# Patient Record
Sex: FEMALE | Race: OTHER | HISPANIC OR LATINO | Employment: FULL TIME | URBAN - METROPOLITAN AREA
[De-identification: names, ages, dates, MRNs, and addresses within clinical notes are randomized per-mention and may not be internally consistent; named-entity substitution may affect disease eponyms.]

---

## 2017-01-17 DIAGNOSIS — Z12.31 ENCOUNTER FOR SCREENING MAMMOGRAM FOR MALIGNANT NEOPLASM OF BREAST: ICD-10-CM

## 2017-02-23 ENCOUNTER — GENERIC CONVERSION - ENCOUNTER (OUTPATIENT)
Dept: OTHER | Facility: OTHER | Age: 42
End: 2017-02-23

## 2017-04-07 LAB — HCV AB SER-ACNC: NEGATIVE

## 2017-04-14 DIAGNOSIS — Z12.31 ENCOUNTER FOR SCREENING MAMMOGRAM FOR MALIGNANT NEOPLASM OF BREAST: ICD-10-CM

## 2017-04-18 ENCOUNTER — GENERIC CONVERSION - ENCOUNTER (OUTPATIENT)
Dept: OTHER | Facility: OTHER | Age: 42
End: 2017-04-18

## 2017-06-13 ENCOUNTER — HOSPITAL ENCOUNTER (OUTPATIENT)
Dept: MAMMOGRAPHY | Facility: MEDICAL CENTER | Age: 42
Discharge: HOME | End: 2017-06-13
Attending: SURGERY
Payer: COMMERCIAL

## 2017-06-13 DIAGNOSIS — Z12.31 ENCOUNTER FOR SCREENING MAMMOGRAM FOR MALIGNANT NEOPLASM OF BREAST: ICD-10-CM

## 2017-06-13 PROCEDURE — G0202 SCR MAMMO BI INCL CAD: HCPCS

## 2017-06-13 PROCEDURE — 77063 BREAST TOMOSYNTHESIS BI: CPT

## 2017-06-30 ENCOUNTER — ALLSCRIPTS OFFICE VISIT (OUTPATIENT)
Dept: OTHER | Facility: OTHER | Age: 42
End: 2017-06-30

## 2017-07-21 ENCOUNTER — ALLSCRIPTS OFFICE VISIT (OUTPATIENT)
Dept: OTHER | Facility: OTHER | Age: 42
End: 2017-07-21

## 2017-09-11 ENCOUNTER — GENERIC CONVERSION - ENCOUNTER (OUTPATIENT)
Dept: OTHER | Facility: OTHER | Age: 42
End: 2017-09-11

## 2017-09-18 ENCOUNTER — ANESTHESIA EVENT (OUTPATIENT)
Dept: PERIOP | Facility: HOSPITAL | Age: 42
End: 2017-09-18
Payer: COMMERCIAL

## 2017-09-18 RX ORDER — IBUPROFEN 200 MG
TABLET ORAL EVERY 6 HOURS PRN
COMMUNITY

## 2017-09-18 RX ORDER — BUDESONIDE AND FORMOTEROL FUMARATE DIHYDRATE 160; 4.5 UG/1; UG/1
2 AEROSOL RESPIRATORY (INHALATION) EVERY EVENING
COMMUNITY
End: 2019-08-08

## 2017-09-18 RX ORDER — ALBUTEROL SULFATE 90 UG/1
2 AEROSOL, METERED RESPIRATORY (INHALATION) EVERY 6 HOURS PRN
COMMUNITY
End: 2019-08-08

## 2017-09-18 RX ORDER — ALBUTEROL SULFATE 2.5 MG/3ML
2.5 SOLUTION RESPIRATORY (INHALATION) EVERY 6 HOURS PRN
COMMUNITY
End: 2019-08-08

## 2017-09-19 ENCOUNTER — ANESTHESIA (OUTPATIENT)
Dept: PERIOP | Facility: HOSPITAL | Age: 42
End: 2017-09-19
Payer: COMMERCIAL

## 2017-09-19 ENCOUNTER — HOSPITAL ENCOUNTER (OUTPATIENT)
Facility: HOSPITAL | Age: 42
Setting detail: OUTPATIENT SURGERY
Discharge: HOME | End: 2017-09-19
Attending: SURGERY | Admitting: SURGERY
Payer: COMMERCIAL

## 2017-09-19 ENCOUNTER — GENERIC CONVERSION - ENCOUNTER (OUTPATIENT)
Dept: OTHER | Facility: OTHER | Age: 42
End: 2017-09-19

## 2017-09-19 VITALS
RESPIRATION RATE: 16 BRPM | SYSTOLIC BLOOD PRESSURE: 111 MMHG | WEIGHT: 160 LBS | HEIGHT: 62 IN | OXYGEN SATURATION: 96 % | DIASTOLIC BLOOD PRESSURE: 58 MMHG | TEMPERATURE: 99 F | BODY MASS INDEX: 29.44 KG/M2 | HEART RATE: 104 BPM

## 2017-09-19 DIAGNOSIS — N62 HYPERTROPHY OF BREAST: ICD-10-CM

## 2017-09-19 PROCEDURE — 88305 TISSUE EXAM BY PATHOLOGIST: CPT | Performed by: SURGERY

## 2017-09-19 PROCEDURE — C1760 CLOSURE DEV, VASC: HCPCS | Performed by: SURGERY

## 2017-09-19 RX ORDER — ONDANSETRON 2 MG/ML
INJECTION INTRAMUSCULAR; INTRAVENOUS AS NEEDED
Status: DISCONTINUED | OUTPATIENT
Start: 2017-09-19 | End: 2017-09-19 | Stop reason: SURG

## 2017-09-19 RX ORDER — MIDAZOLAM HYDROCHLORIDE 1 MG/ML
INJECTION INTRAMUSCULAR; INTRAVENOUS AS NEEDED
Status: DISCONTINUED | OUTPATIENT
Start: 2017-09-19 | End: 2017-09-19 | Stop reason: SURG

## 2017-09-19 RX ORDER — HYDROMORPHONE HCL 110MG/55ML
0.5 PATIENT CONTROLLED ANALGESIA SYRINGE INTRAVENOUS
Status: DISCONTINUED | OUTPATIENT
Start: 2017-09-19 | End: 2017-09-19 | Stop reason: HOSPADM

## 2017-09-19 RX ORDER — OXYCODONE HYDROCHLORIDE AND ACETAMINOPHEN 5; 325 MG/1; MG/1
1 TABLET ORAL EVERY 4 HOURS PRN
Qty: 20 TABLET | Refills: 0 | Status: SHIPPED | OUTPATIENT
Start: 2017-09-19 | End: 2018-06-04 | Stop reason: ALTCHOICE

## 2017-09-19 RX ORDER — BUPIVACAINE HYDROCHLORIDE 2.5 MG/ML
INJECTION, SOLUTION EPIDURAL; INFILTRATION; INTRACAUDAL AS NEEDED
Status: DISCONTINUED | OUTPATIENT
Start: 2017-09-19 | End: 2017-09-19 | Stop reason: HOSPADM

## 2017-09-19 RX ORDER — ROCURONIUM BROMIDE 10 MG/ML
INJECTION, SOLUTION INTRAVENOUS AS NEEDED
Status: DISCONTINUED | OUTPATIENT
Start: 2017-09-19 | End: 2017-09-19 | Stop reason: SURG

## 2017-09-19 RX ORDER — OXYCODONE HYDROCHLORIDE AND ACETAMINOPHEN 5; 325 MG/1; MG/1
1 TABLET ORAL EVERY 4 HOURS PRN
Status: DISCONTINUED | OUTPATIENT
Start: 2017-09-19 | End: 2017-09-19 | Stop reason: HOSPADM

## 2017-09-19 RX ORDER — SODIUM CHLORIDE 9 MG/ML
125 INJECTION, SOLUTION INTRAVENOUS CONTINUOUS
Status: DISCONTINUED | OUTPATIENT
Start: 2017-09-19 | End: 2017-09-19 | Stop reason: HOSPADM

## 2017-09-19 RX ORDER — PROPOFOL 10 MG/ML
INJECTION, EMULSION INTRAVENOUS AS NEEDED
Status: DISCONTINUED | OUTPATIENT
Start: 2017-09-19 | End: 2017-09-19 | Stop reason: SURG

## 2017-09-19 RX ORDER — GLYCOPYRROLATE 0.2 MG/ML
INJECTION INTRAMUSCULAR; INTRAVENOUS AS NEEDED
Status: DISCONTINUED | OUTPATIENT
Start: 2017-09-19 | End: 2017-09-19 | Stop reason: SURG

## 2017-09-19 RX ORDER — HYDROMORPHONE HYDROCHLORIDE 2 MG/ML
INJECTION, SOLUTION INTRAMUSCULAR; INTRAVENOUS; SUBCUTANEOUS AS NEEDED
Status: DISCONTINUED | OUTPATIENT
Start: 2017-09-19 | End: 2017-09-19 | Stop reason: SURG

## 2017-09-19 RX ORDER — FENTANYL CITRATE/PF 50 MCG/ML
25 SYRINGE (ML) INJECTION
Status: COMPLETED | OUTPATIENT
Start: 2017-09-19 | End: 2017-09-19

## 2017-09-19 RX ORDER — FENTANYL CITRATE 50 UG/ML
INJECTION, SOLUTION INTRAMUSCULAR; INTRAVENOUS AS NEEDED
Status: DISCONTINUED | OUTPATIENT
Start: 2017-09-19 | End: 2017-09-19 | Stop reason: SURG

## 2017-09-19 RX ORDER — MAGNESIUM HYDROXIDE 1200 MG/15ML
LIQUID ORAL AS NEEDED
Status: DISCONTINUED | OUTPATIENT
Start: 2017-09-19 | End: 2017-09-19 | Stop reason: HOSPADM

## 2017-09-19 RX ORDER — SODIUM CHLORIDE, SODIUM LACTATE, POTASSIUM CHLORIDE, CALCIUM CHLORIDE 600; 310; 30; 20 MG/100ML; MG/100ML; MG/100ML; MG/100ML
100 INJECTION, SOLUTION INTRAVENOUS CONTINUOUS
Status: DISCONTINUED | OUTPATIENT
Start: 2017-09-19 | End: 2017-09-19 | Stop reason: HOSPADM

## 2017-09-19 RX ORDER — ONDANSETRON 2 MG/ML
4 INJECTION INTRAMUSCULAR; INTRAVENOUS ONCE AS NEEDED
Status: DISCONTINUED | OUTPATIENT
Start: 2017-09-19 | End: 2017-09-19 | Stop reason: HOSPADM

## 2017-09-19 RX ORDER — LIDOCAINE HYDROCHLORIDE AND EPINEPHRINE 10; 10 MG/ML; UG/ML
INJECTION, SOLUTION INFILTRATION; PERINEURAL AS NEEDED
Status: DISCONTINUED | OUTPATIENT
Start: 2017-09-19 | End: 2017-09-19 | Stop reason: HOSPADM

## 2017-09-19 RX ADMIN — OXYCODONE HYDROCHLORIDE AND ACETAMINOPHEN 1 TABLET: 5; 325 TABLET ORAL at 14:40

## 2017-09-19 RX ADMIN — FENTANYL CITRATE 25 MCG: 50 INJECTION, SOLUTION INTRAMUSCULAR; INTRAVENOUS at 13:13

## 2017-09-19 RX ADMIN — HYDROMORPHONE HYDROCHLORIDE 1 MG: 2 INJECTION, SOLUTION INTRAMUSCULAR; INTRAVENOUS; SUBCUTANEOUS at 11:11

## 2017-09-19 RX ADMIN — SODIUM CHLORIDE: 0.9 INJECTION, SOLUTION INTRAVENOUS at 12:01

## 2017-09-19 RX ADMIN — FENTANYL CITRATE 50 MCG: 50 INJECTION, SOLUTION INTRAMUSCULAR; INTRAVENOUS at 08:58

## 2017-09-19 RX ADMIN — ROCURONIUM BROMIDE 10 MG: 10 INJECTION, SOLUTION INTRAVENOUS at 11:11

## 2017-09-19 RX ADMIN — FENTANYL CITRATE 25 MCG: 50 INJECTION, SOLUTION INTRAMUSCULAR; INTRAVENOUS at 13:10

## 2017-09-19 RX ADMIN — FENTANYL CITRATE 50 MCG: 50 INJECTION, SOLUTION INTRAMUSCULAR; INTRAVENOUS at 09:49

## 2017-09-19 RX ADMIN — HYDROMORPHONE HYDROCHLORIDE 1 MG: 2 INJECTION, SOLUTION INTRAMUSCULAR; INTRAVENOUS; SUBCUTANEOUS at 10:55

## 2017-09-19 RX ADMIN — FENTANYL CITRATE 50 MCG: 50 INJECTION, SOLUTION INTRAMUSCULAR; INTRAVENOUS at 10:21

## 2017-09-19 RX ADMIN — SODIUM CHLORIDE: 0.9 INJECTION, SOLUTION INTRAVENOUS at 09:45

## 2017-09-19 RX ADMIN — LIDOCAINE HYDROCHLORIDE 40 MG: 20 INJECTION, SOLUTION INTRAVENOUS at 08:58

## 2017-09-19 RX ADMIN — NEOSTIGMINE METHYLSULFATE 5 MG: 1 INJECTION, SOLUTION INTRAMUSCULAR; INTRAVENOUS; SUBCUTANEOUS at 12:27

## 2017-09-19 RX ADMIN — CEFAZOLIN SODIUM 1000 MG: 1 SOLUTION INTRAVENOUS at 12:54

## 2017-09-19 RX ADMIN — ROCURONIUM BROMIDE 20 MG: 10 INJECTION, SOLUTION INTRAVENOUS at 10:21

## 2017-09-19 RX ADMIN — PROPOFOL 200 MG: 10 INJECTION, EMULSION INTRAVENOUS at 08:58

## 2017-09-19 RX ADMIN — GLYCOPYRROLATE 0.6 MG: 0.2 INJECTION, SOLUTION INTRAMUSCULAR; INTRAVENOUS at 12:27

## 2017-09-19 RX ADMIN — HYDROMORPHONE HYDROCHLORIDE 0.5 MG: 2 INJECTION, SOLUTION INTRAMUSCULAR; INTRAVENOUS; SUBCUTANEOUS at 13:41

## 2017-09-19 RX ADMIN — DEXAMETHASONE SODIUM PHOSPHATE 4 MG: 10 INJECTION INTRAMUSCULAR; INTRAVENOUS at 09:25

## 2017-09-19 RX ADMIN — MIDAZOLAM HYDROCHLORIDE 2 MG: 1 INJECTION, SOLUTION INTRAMUSCULAR; INTRAVENOUS at 08:54

## 2017-09-19 RX ADMIN — ROCURONIUM BROMIDE 20 MG: 10 INJECTION, SOLUTION INTRAVENOUS at 09:48

## 2017-09-19 RX ADMIN — FENTANYL CITRATE 50 MCG: 50 INJECTION, SOLUTION INTRAMUSCULAR; INTRAVENOUS at 09:44

## 2017-09-19 RX ADMIN — CEFAZOLIN SODIUM 1000 MG: 1 SOLUTION INTRAVENOUS at 08:54

## 2017-09-19 RX ADMIN — HYDROMORPHONE HYDROCHLORIDE 0.5 MG: 2 INJECTION, SOLUTION INTRAMUSCULAR; INTRAVENOUS; SUBCUTANEOUS at 13:31

## 2017-09-19 RX ADMIN — FENTANYL CITRATE 25 MCG: 50 INJECTION, SOLUTION INTRAMUSCULAR; INTRAVENOUS at 13:20

## 2017-09-19 RX ADMIN — SODIUM CHLORIDE 125 ML/HR: 0.9 INJECTION, SOLUTION INTRAVENOUS at 07:30

## 2017-09-19 RX ADMIN — ONDANSETRON HYDROCHLORIDE 4 MG: 2 INJECTION, SOLUTION INTRAVENOUS at 12:02

## 2017-09-19 RX ADMIN — ROCURONIUM BROMIDE 50 MG: 10 INJECTION, SOLUTION INTRAVENOUS at 08:58

## 2017-09-21 ENCOUNTER — GENERIC CONVERSION - ENCOUNTER (OUTPATIENT)
Dept: OTHER | Facility: OTHER | Age: 42
End: 2017-09-21

## 2017-10-03 ENCOUNTER — GENERIC CONVERSION - ENCOUNTER (OUTPATIENT)
Dept: OTHER | Facility: OTHER | Age: 42
End: 2017-10-03

## 2017-12-19 ENCOUNTER — ALLSCRIPTS OFFICE VISIT (OUTPATIENT)
Dept: OTHER | Facility: OTHER | Age: 42
End: 2017-12-19

## 2018-01-08 ENCOUNTER — GENERIC CONVERSION - ENCOUNTER (OUTPATIENT)
Dept: OTHER | Facility: OTHER | Age: 43
End: 2018-01-08

## 2018-01-09 ENCOUNTER — ALLSCRIPTS OFFICE VISIT (OUTPATIENT)
Dept: OTHER | Facility: OTHER | Age: 43
End: 2018-01-09

## 2018-01-12 VITALS — BODY MASS INDEX: 28.2 KG/M2 | HEIGHT: 62 IN | WEIGHT: 153.25 LBS

## 2018-01-13 VITALS
HEIGHT: 62 IN | TEMPERATURE: 99.1 F | SYSTOLIC BLOOD PRESSURE: 119 MMHG | RESPIRATION RATE: 16 BRPM | DIASTOLIC BLOOD PRESSURE: 79 MMHG | HEART RATE: 80 BPM | BODY MASS INDEX: 27.99 KG/M2 | WEIGHT: 152.13 LBS

## 2018-01-13 NOTE — MISCELLANEOUS
Message   Recorded as Task   Date: 2015 01:47 AM, Created By: System   Task Name:  Order   Assigned To: [facility],Team   Regarding Patient: Keith Cruz, Status: Complete   Comment:    System - 18 Dec 2015 1:47 AM     Digital Bilateral Screening Mammogram With CAD requires  Order   Melodie Muñoz - 2016 2:23 PM     TASK EDITED  Called and spoke to patient  She states that her screening mammogram is scheduled (at [facility]) in February  Informed patient that if I did not receive results by mid-March, I would be calling her again  She stated that was fine  Melodie Muñoz - 09 Mar 2016 9:07 AM     TASK COMPLETED        Active Problems    1  Encounter for screening mammogram for breast cancer (V76 12) (Z12 31)   2  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

## 2018-01-13 NOTE — MISCELLANEOUS
Message   Recorded as Task   Date: 04/14/2017 02:43 PM, Created By: System   Task Name: Schedule Appointment   Assigned To: Tootie Dc   Regarding Patient: Perez Costa, Status: Active   Comment:    System - 14 Apr 2017 2:43 PM        Melodie Muñoz - 18 Apr 2017 11:02 AM     TASK EDITED  Called and left message for patient  1) over due for mammo  2) if script is to be obtained from Dr Brianna Marcelo, patient needs to schedule RCL     last seen 12/2014  (Unsure of why I didn't mention this to patient in February when we spoke     )    will await patient contact  Active Problems    1  Encounter for screening mammogram for breast cancer (V76 12) (Z12 31)   2  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

## 2018-01-22 VITALS — BODY MASS INDEX: 28.96 KG/M2 | WEIGHT: 157.38 LBS | HEIGHT: 62 IN

## 2018-01-22 VITALS — WEIGHT: 157 LBS | HEIGHT: 62 IN | BODY MASS INDEX: 28.89 KG/M2

## 2018-01-22 VITALS — HEIGHT: 62 IN | BODY MASS INDEX: 28.89 KG/M2 | WEIGHT: 157 LBS

## 2018-01-23 VITALS — BODY MASS INDEX: 28.89 KG/M2 | WEIGHT: 157 LBS | HEIGHT: 62 IN

## 2018-01-23 NOTE — MISCELLANEOUS
Message   Date: 08 Jan 2018 11:00 AM EST, Recorded By: Cyndee Warren For: Geoffrey Hartman: [place], Spouse   Phone: [phone] (Home)   Reason: Other   Patient's wife [place], calling to report that patient has been experiencing sharp shooting breast pain in right breast as well as itching with mild discomfort in left for the last 3 days  States tender to palpation  Denies erythema, drainage, fever, body aches/chills  Patient missed last appt 12/19/17  Offered appt tomorrow  at 10:30 am with [facility]aud  Agreeable to this  Patient instructed to call with worsening symptoms  Wife [place] verbalized understanding  Active Problems    1  Bilateral fibrocystic breast changes (610 1) (N60 11,N60 12)   2  Encounter for screening mammogram for breast cancer (V76 12) (Z12 31)   3  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

## 2018-01-23 NOTE — MISCELLANEOUS
Provider Comments  Provider Comments:   Patient did not show for scheduled appointment today  Signatures   Electronically signed by :  Shannon Sky, ; Dec 20 2017 12:03PM EST                       (Author)

## 2018-04-27 ENCOUNTER — TRANSCRIBE ORDERS (OUTPATIENT)
Dept: ADMINISTRATIVE | Facility: HOSPITAL | Age: 43
End: 2018-04-27

## 2018-04-27 DIAGNOSIS — R10.11 ABDOMINAL PAIN, RIGHT UPPER QUADRANT: Primary | ICD-10-CM

## 2018-05-03 ENCOUNTER — HOSPITAL ENCOUNTER (OUTPATIENT)
Dept: NUCLEAR MEDICINE | Facility: HOSPITAL | Age: 43
Discharge: HOME | End: 2018-05-03
Payer: COMMERCIAL

## 2018-05-03 DIAGNOSIS — R10.11 ABDOMINAL PAIN, RIGHT UPPER QUADRANT: ICD-10-CM

## 2018-05-03 PROCEDURE — 78227 HEPATOBIL SYST IMAGE W/DRUG: CPT

## 2018-05-03 PROCEDURE — A9537 TC99M MEBROFENIN: HCPCS

## 2018-05-03 RX ADMIN — SINCALIDE 1.4 MCG: 5 INJECTION, POWDER, LYOPHILIZED, FOR SOLUTION INTRAVENOUS at 09:30

## 2018-06-04 ENCOUNTER — OFFICE VISIT (OUTPATIENT)
Dept: URGENT CARE | Age: 43
End: 2018-06-04
Payer: COMMERCIAL

## 2018-06-04 VITALS
OXYGEN SATURATION: 100 % | HEIGHT: 62 IN | BODY MASS INDEX: 29.63 KG/M2 | HEART RATE: 84 BPM | SYSTOLIC BLOOD PRESSURE: 129 MMHG | TEMPERATURE: 98.4 F | WEIGHT: 161 LBS | DIASTOLIC BLOOD PRESSURE: 77 MMHG | RESPIRATION RATE: 18 BRPM

## 2018-06-04 DIAGNOSIS — S29.019A THORACIC MYOFASCIAL STRAIN, INITIAL ENCOUNTER: Primary | ICD-10-CM

## 2018-06-04 PROCEDURE — G0382 LEV 3 HOSP TYPE B ED VISIT: HCPCS | Performed by: FAMILY MEDICINE

## 2018-06-04 RX ORDER — PREDNISONE 10 MG/1
TABLET ORAL
Qty: 21 TABLET | Refills: 0 | Status: SHIPPED | OUTPATIENT
Start: 2018-06-04 | End: 2019-08-08

## 2018-06-04 RX ORDER — OMEPRAZOLE 40 MG/1
40 CAPSULE, DELAYED RELEASE ORAL DAILY
COMMUNITY
Start: 2017-01-23 | End: 2020-04-02 | Stop reason: SDUPTHER

## 2018-06-04 RX ORDER — CYCLOBENZAPRINE HCL 10 MG
10 TABLET ORAL 3 TIMES DAILY PRN
Qty: 15 TABLET | Refills: 0 | Status: SHIPPED | OUTPATIENT
Start: 2018-06-04 | End: 2019-08-08

## 2018-06-04 NOTE — LETTER
June 4, 2018     Patient: Alexis Woodard   YOB: 1975   Date of Visit: 6/4/2018       To Whom It May Concern: It is my medical opinion that Alexis Woodard may return to work on 06/05/2018  If you have any questions or concerns, please don't hesitate to call           Sincerely,        Ana Iverson PA-C    CC: No Recipients

## 2018-06-04 NOTE — PATIENT INSTRUCTIONS
Take prednisone taper as directed with food   Avoid NSAIDs while taking  Take flexeril every 8 hours as needed (will cause drowsiness)- no robaxin while taking  Ice to back 20 minutes at a time, several times per day  Keep moving, but avoid heavy lifting and twisting  Gentle stretches several times day  Recheck with your PCP in 5-7 days if no improvement  ER if worsening or if new symptoms develop such as loss of bowel or bladder function, weakness or difficulty walking

## 2018-06-04 NOTE — PROGRESS NOTES
[address]        NAME: Marie Brito is a 41 y o  female  : 1975    MRN: [id number]  DATE: 2018  TIME: 9:34 AM    Assessment and Plan   Thoracic myofascial strain, initial encounter [I20 034F]  1  Thoracic myofascial strain, initial encounter  predniSONE 10 mg tablet    cyclobenzaprine (FLEXERIL) 10 mg tablet         Patient Instructions     Take prednisone taper as directed with food  Avoid NSAIDs while taking  Take flexeril every 8 hours as needed (will cause drowsiness)- no robaxin while taking  Ice to back 20 minutes at a time, several times per day  Keep moving, but avoid heavy lifting and twisting  Gentle stretches several times day  Recheck with your PCP in 5-7 days if no improvement  ER if worsening or if new symptoms develop such as loss of bowel or bladder function, weakness or difficulty walking      Chief Complaint     Chief Complaint   Patient presents with    Back Pain     upper left side         History of Present Illness       42 y/o female presenting with upper back pain for 1 day  She states she was sitting in the car when the pain began  She took 800mg ibuprofen, which minimally helped with the pain  She took Robaxin last night which did not help  When she went to work this morning she states she could barely move and any movements exacerbated her pain  She does not recall any specific injury to her back  She went fishing after the onset of pain  She denies paresthesias, numbness, weakness, fevers  Review of Systems   Review of Systems   Constitutional: Negative for fever  Respiratory: Negative for shortness of breath  Musculoskeletal: Positive for back pain  Negative for gait problem, neck pain and neck stiffness  Skin: Negative  Neurological: Negative for dizziness, weakness and numbness  All other systems reviewed and are negative          Current Medications       Current Outpatient Prescriptions:     albuterol (2 5 mg/3 mL) 0 083 % nebulizer solution, Take 2 5 mg by nebulization every 6 (six) hours as needed for wheezing, Disp: , Rfl:     albuterol (PROVENTIL HFA,VENTOLIN HFA) 90 mcg/act inhaler, Inhale 2 puffs every 6 (six) hours as needed for wheezing, Disp: , Rfl:     budesonide-formoterol (SYMBICORT) 160-4 5 mcg/act inhaler, Inhale 2 puffs every evening, Disp: , Rfl:     ibuprofen (ADVIL) 200 mg tablet, Take by mouth every 6 (six) hours as needed for mild pain, Disp: , Rfl:     omeprazole (PRILOSEC) 20 mg delayed release capsule, Take 20 mg by mouth, Disp: , Rfl:     cyclobenzaprine (FLEXERIL) 10 mg tablet, Take 1 tablet (10 mg total) by mouth 3 (three) times a day as needed for muscle spasms, Disp: 15 tablet, Rfl: 0    predniSONE 10 mg tablet, Day 1: take 6; day 2: take 5; day 3: take 4; day 4: take 3; day 5: take 2; Day 6: take 1 with food, Disp: 21 tablet, Rfl: 0    Current Allergies     Allergies as of 06/04/2018    (No Known Allergies)            The following portions of the patient's history were reviewed and updated as appropriate: allergies, current medications, past family history, past medical history, past social history, past surgical history and problem list    Past Medical History:   Diagnosis Date    Asthma     History of transfusion     approx 10 yrs ago    Irritable bowel syndrome     Low back pain     Neck pain     Shortness of breath     "when asthma acts up"    Wears glasses      Past Surgical History:   Procedure Laterality Date    COLONOSCOPY      DILATION AND CURETTAGE OF UTERUS      ESOPHAGOGASTRODUODENOSCOPY      EYE SURGERY      HYSTERECTOMY      OVARY SURGERY      [place] REDUCTION OF LARGE BREAST Bilateral 9/19/2017    Procedure: REDUCTION MAMMOPLASTY BREAST;  Surgeon: Mike Garrison MD;  Location: [facility];  Service: Plastics           Objective   /77   Pulse 84   Temp 98 4 °F (36 9 °C)   Resp 18   Ht 5' 2" (1 575 m)   Wt 73 kg (161 lb)   SpO2 100%   BMI 29 45 kg/m²        Physical Exam     Physical Exam   Constitutional: She is oriented to person, place, and time  She appears well-developed and well-nourished  Musculoskeletal: She exhibits tenderness (TTP over L thoracic paraspinal muscles  )  She exhibits no deformity  Pt refused ROM and special tests due to discomfort  Neurological: She is alert and oriented to person, place, and time  She exhibits normal muscle tone  Coordination normal    Skin: Skin is warm and dry  No rash noted  No erythema  No redness or lesions in area of pain on back

## 2019-02-22 ENCOUNTER — OFFICE VISIT (OUTPATIENT)
Dept: SURGERY | Facility: CLINIC | Age: 44
End: 2019-02-22
Payer: COMMERCIAL

## 2019-02-22 ENCOUNTER — TRANSCRIBE ORDERS (OUTPATIENT)
Dept: ADMINISTRATIVE | Facility: HOSPITAL | Age: 44
End: 2019-02-22

## 2019-02-22 VITALS
SYSTOLIC BLOOD PRESSURE: 122 MMHG | BODY MASS INDEX: 29.63 KG/M2 | TEMPERATURE: 98.4 F | HEART RATE: 88 BPM | WEIGHT: 161 LBS | DIASTOLIC BLOOD PRESSURE: 84 MMHG | RESPIRATION RATE: 18 BRPM | HEIGHT: 62 IN

## 2019-02-22 DIAGNOSIS — Z12.39 SCREENING BREAST EXAMINATION: Primary | ICD-10-CM

## 2019-02-22 DIAGNOSIS — Z12.31 BREAST CANCER SCREENING BY MAMMOGRAM: Primary | ICD-10-CM

## 2019-02-22 PROCEDURE — 99214 OFFICE O/P EST MOD 30 MIN: CPT | Performed by: FAMILY MEDICINE

## 2019-02-22 RX ORDER — DICYCLOMINE HYDROCHLORIDE 10 MG/1
CAPSULE ORAL
COMMUNITY
Start: 2016-12-02 | End: 2019-08-08

## 2019-02-22 RX ORDER — OMEPRAZOLE 20 MG/1
20 CAPSULE, DELAYED RELEASE ORAL
COMMUNITY
Start: 2016-12-02 | End: 2019-08-08

## 2019-02-22 RX ORDER — BUDESONIDE AND FORMOTEROL FUMARATE DIHYDRATE 160; 4.5 UG/1; UG/1
2 AEROSOL RESPIRATORY (INHALATION)
COMMUNITY
Start: 2018-01-31 | End: 2019-07-08

## 2019-02-22 RX ORDER — ESOMEPRAZOLE MAGNESIUM 40 MG/1
40 CAPSULE, DELAYED RELEASE ORAL
COMMUNITY
End: 2019-08-08

## 2019-02-23 ENCOUNTER — HOSPITAL ENCOUNTER (OUTPATIENT)
Dept: RADIOLOGY | Age: 44
Discharge: HOME | End: 2019-02-23
Payer: COMMERCIAL

## 2019-02-23 VITALS — HEIGHT: 62 IN | WEIGHT: 161 LBS | BODY MASS INDEX: 29.63 KG/M2

## 2019-02-23 DIAGNOSIS — Z12.39 SCREENING BREAST EXAMINATION: ICD-10-CM

## 2019-02-23 PROCEDURE — 77067 SCR MAMMO BI INCL CAD: CPT

## 2019-02-23 PROCEDURE — 77063 BREAST TOMOSYNTHESIS BI: CPT

## 2019-07-08 ENCOUNTER — OFFICE VISIT (OUTPATIENT)
Dept: URGENT CARE | Age: 44
End: 2019-07-08
Payer: COMMERCIAL

## 2019-07-08 VITALS
RESPIRATION RATE: 20 BRPM | SYSTOLIC BLOOD PRESSURE: 137 MMHG | WEIGHT: 167 LBS | HEIGHT: 62 IN | BODY MASS INDEX: 30.73 KG/M2 | DIASTOLIC BLOOD PRESSURE: 86 MMHG | OXYGEN SATURATION: 98 % | TEMPERATURE: 99.2 F | HEART RATE: 102 BPM

## 2019-07-08 DIAGNOSIS — F41.9 ANXIETY: Primary | ICD-10-CM

## 2019-07-08 PROCEDURE — 99213 OFFICE O/P EST LOW 20 MIN: CPT | Performed by: PHYSICIAN ASSISTANT

## 2019-07-08 RX ORDER — BUDESONIDE AND FORMOTEROL FUMARATE DIHYDRATE 160; 4.5 UG/1; UG/1
2 AEROSOL RESPIRATORY (INHALATION) 2 TIMES DAILY
COMMUNITY
Start: 2018-01-31 | End: 2019-08-08

## 2019-07-08 RX ORDER — HYDROXYZINE HYDROCHLORIDE 25 MG/1
25 TABLET, FILM COATED ORAL EVERY 6 HOURS PRN
Qty: 60 TABLET | Refills: 0 | Status: SHIPPED | OUTPATIENT
Start: 2019-07-08 | End: 2019-08-08

## 2019-07-08 RX ORDER — MELOXICAM 15 MG/1
TABLET ORAL
COMMUNITY
Start: 2017-09-26 | End: 2019-08-08

## 2019-07-09 NOTE — PROGRESS NOTES
[address]        NAME: Jared Severs is a 45 y o  female  : 1975    MRN: [id number]  DATE: 2019  TIME: 8:15 PM    Assessment and Plan   Anxiety [F41 9]  1  Anxiety  hydrOXYzine HCL (ATARAX) 25 mg tablet         Patient Instructions     Patient Instructions   May take hydroxyzine up to every 6 hours for anxiety  This medication may make you drowsy, make sure you do not get drowsy before driving or operating machinery  Please see PCP as soon as possible for better management of your symptoms  Consider trying a new counselor  Go to ER immediately if you have any thoughts of harming yourself or others      Follow up with PCP in 3-5 days  Proceed to  ER if symptoms worsen  Chief Complaint     Chief Complaint   Patient presents with    Anxiety     Pt states for the past 2yrs she has been living with her mother-in-law who has end-stage cancer and her  whom she states is an alcoholic  Pt states she has been becoming increasingly overwhelmed, stressed, and angry  She states her mother-in-law is ungrateful  Patient states she feels trapped in the house and has no where to escape  Pt states she has been going to therapy for symptoms but she started  Pt states she feels safe at home and she has no thoughts of harming herself or others  History of Present Illness       46 y/o F presents c/o overwhelming anxiety  Has been worsening over the past 2 years since moving in with her terminal mother in law who has been unappreciative of her and alcoholic   Always feels overwhelmed and anxious, unable to concentrate, not sleeping well, fatigued from not sleeping  Finds relief by doing outdoor activities with her daughter and cycling club but symptoms return when she returns to the house  Notes she is eating more than usual  Denies suicidal or homicidal ideation   Saw her PCP a year ago who prescribed a medication that her sister told her would "make her sleep for days" so she never took it  Started seeing a counselor a month ago but has not found it helpful  Still going to work but does not feel as though she is doing well      Review of Systems   Review of Systems   Constitutional: Positive for fatigue  Psychiatric/Behavioral: Positive for agitation, decreased concentration, dysphoric mood and sleep disturbance  Negative for suicidal ideas  The patient is nervous/anxious  Current Medications       Current Outpatient Medications:     budesonide-formoterol (SYMBICORT) 160-4 5 mcg/act inhaler, Inhale 2 puffs 2 (two) times a day, Disp: , Rfl:     meloxicam (MOBIC) 15 mg tablet, take 1 tablet by mouth once daily with food, Disp: , Rfl:     albuterol (2 5 mg/3 mL) 0 083 % nebulizer solution, Take 2 5 mg by nebulization every 6 (six) hours as needed for wheezing, Disp: , Rfl:     albuterol (PROVENTIL HFA,VENTOLIN HFA) 90 mcg/act inhaler, Inhale 2 puffs every 6 (six) hours as needed for wheezing, Disp: , Rfl:     Albuterol Sulfate 108 (90 Base) MCG/ACT AEPB, Inhale 180 mcg every 4 (four) hours, Disp: , Rfl:     budesonide-formoterol (SYMBICORT) 160-4 5 mcg/act inhaler, Inhale 2 puffs every evening, Disp: , Rfl:     cyclobenzaprine (FLEXERIL) 10 mg tablet, Take 1 tablet (10 mg total) by mouth 3 (three) times a day as needed for muscle spasms, Disp: 15 tablet, Rfl: 0    dicyclomine (BENTYL) 10 mg capsule, TAKE 1 CAPSULE (10 MG TOTAL) BY MOUTH 3 (THREE) TIMES A DAY AS NEEDED (CRAMPING)  , Disp: , Rfl:     esomeprazole (NexIUM) 40 MG capsule, Take 40 mg by mouth every morning before breakfast, Disp: , Rfl:     guaifenesin-codeine (GUAIFENESIN AC) 100-10 MG/5ML liquid, Take 5 mL by mouth 2 (two) times a day as needed, Disp: , Rfl:     hydrOXYzine HCL (ATARAX) 25 mg tablet, Take 1 tablet (25 mg total) by mouth every 6 (six) hours as needed for anxiety for up to 15 days, Disp: 60 tablet, Rfl: 0    ibuprofen (ADVIL) 200 mg tablet, Take by mouth every 6 (six) hours as needed for mild pain, Disp: , Rfl:     omeprazole (PRILOSEC) 20 mg delayed release capsule, Take 20 mg by mouth, Disp: , Rfl:     omeprazole (PRILOSEC) 20 mg delayed release capsule, Take 20 mg by mouth, Disp: , Rfl:     predniSONE 10 mg tablet, Day 1: take 6; day 2: take 5; day 3: take 4; day 4: take 3; day 5: take 2; Day 6: take 1 with food (Patient not taking: Reported on 2/22/2019), Disp: 21 tablet, Rfl: 0    Current Allergies     Allergies as of 07/08/2019    (No Known Allergies)            The following portions of the patient's history were reviewed and updated as appropriate: allergies, current medications, past family history, past medical history, past social history, past surgical history and problem list      Past Medical History:   Diagnosis Date    Asthma     History of transfusion     approx 10 yrs ago    Irritable bowel syndrome     Low back pain     Neck pain     Shortness of breath     "when asthma acts up"    Wears glasses        Past Surgical History:   Procedure Laterality Date    COLONOSCOPY      DILATION AND CURETTAGE OF UTERUS      ESOPHAGOGASTRODUODENOSCOPY      EYE SURGERY      HYSTERECTOMY      age 37    OOPHORECTOMY Left     age 37    OVARY SURGERY      [place] REDUCTION OF LARGE BREAST Bilateral 9/19/2017    Procedure: REDUCTION MAMMOPLASTY BREAST;  Surgeon: Luis Vo MD;  Location: [facility];  Service: Plastics    REDUCTION MAMMAPLASTY Bilateral 2017       Family History   Problem Relation Age of Onset    Breast cancer Mother 54    Ovarian cancer Sister 45    Breast cancer Maternal Aunt 50         Medications have been verified  Objective   /86   Pulse 102   Temp 99 2 °F (37 3 °C)   Resp 20   Ht 5' 2" (1 575 m)   Wt 75 8 kg (167 lb)   SpO2 98%   BMI 30 54 kg/m²        Physical Exam     Physical Exam   Constitutional: She appears well-developed and well-nourished  She appears distressed  Skin: She is not diaphoretic     Psychiatric: Her speech is normal and behavior is normal  Judgment and thought content normal  Her mood appears anxious

## 2019-07-09 NOTE — PATIENT INSTRUCTIONS
May take hydroxyzine up to every 6 hours for anxiety  This medication may make you drowsy, make sure you do not get drowsy before driving or operating machinery    Please see PCP as soon as possible for better management of your symptoms  Consider trying a new counselor  Go to ER immediately if you have any thoughts of harming yourself or others

## 2019-08-08 ENCOUNTER — APPOINTMENT (OUTPATIENT)
Dept: LAB | Facility: CLINIC | Age: 44
End: 2019-08-08
Payer: COMMERCIAL

## 2019-08-08 ENCOUNTER — OFFICE VISIT (OUTPATIENT)
Dept: FAMILY MEDICINE CLINIC | Facility: CLINIC | Age: 44
End: 2019-08-08
Payer: COMMERCIAL

## 2019-08-08 VITALS
HEIGHT: 63 IN | SYSTOLIC BLOOD PRESSURE: 110 MMHG | RESPIRATION RATE: 16 BRPM | WEIGHT: 169 LBS | DIASTOLIC BLOOD PRESSURE: 68 MMHG | TEMPERATURE: 98.3 F | OXYGEN SATURATION: 97 % | BODY MASS INDEX: 29.95 KG/M2 | HEART RATE: 88 BPM

## 2019-08-08 DIAGNOSIS — Z13.29 THYROID DISORDER SCREEN: ICD-10-CM

## 2019-08-08 DIAGNOSIS — Z83.49 FAMILY HISTORY OF THYROID DISORDER: ICD-10-CM

## 2019-08-08 DIAGNOSIS — R63.5 WEIGHT GAIN: ICD-10-CM

## 2019-08-08 DIAGNOSIS — G89.29 CHRONIC RIGHT SHOULDER PAIN: ICD-10-CM

## 2019-08-08 DIAGNOSIS — G56.22 CUBITAL TUNNEL SYNDROME, LEFT: ICD-10-CM

## 2019-08-08 DIAGNOSIS — E55.9 VITAMIN D DEFICIENCY: ICD-10-CM

## 2019-08-08 DIAGNOSIS — M25.611 DECREASED RIGHT SHOULDER RANGE OF MOTION: ICD-10-CM

## 2019-08-08 DIAGNOSIS — Z86.2 HISTORY OF ANEMIA: ICD-10-CM

## 2019-08-08 DIAGNOSIS — M54.41 CHRONIC BILATERAL LOW BACK PAIN WITH RIGHT-SIDED SCIATICA: ICD-10-CM

## 2019-08-08 DIAGNOSIS — G89.29 CHRONIC BILATERAL LOW BACK PAIN WITH RIGHT-SIDED SCIATICA: ICD-10-CM

## 2019-08-08 DIAGNOSIS — M25.511 CHRONIC RIGHT SHOULDER PAIN: ICD-10-CM

## 2019-08-08 DIAGNOSIS — J45.30 MILD PERSISTENT ASTHMA WITHOUT COMPLICATION: ICD-10-CM

## 2019-08-08 DIAGNOSIS — S46.911S STRAIN OF RIGHT SHOULDER, SEQUELA: ICD-10-CM

## 2019-08-08 DIAGNOSIS — Z76.89 ENCOUNTER TO ESTABLISH CARE: Primary | ICD-10-CM

## 2019-08-08 PROBLEM — N60.11 BILATERAL FIBROCYSTIC BREAST CHANGES: Status: ACTIVE | Noted: 2017-07-21

## 2019-08-08 PROBLEM — J45.909 EXTRINSIC ASTHMA WITHOUT STATUS ASTHMATICUS: Status: ACTIVE | Noted: 2017-07-05

## 2019-08-08 PROBLEM — N60.12 BILATERAL FIBROCYSTIC BREAST CHANGES: Status: ACTIVE | Noted: 2017-07-21

## 2019-08-08 PROBLEM — N62 MACROMASTIA: Status: ACTIVE | Noted: 2017-06-30

## 2019-08-08 LAB
25(OH)D3 SERPL-MCNC: 21.7 NG/ML (ref 30–100)
ALBUMIN SERPL BCP-MCNC: 4.3 G/DL (ref 3.5–5)
ALP SERPL-CCNC: 64 U/L (ref 46–116)
ALT SERPL W P-5'-P-CCNC: 21 U/L (ref 12–78)
ANION GAP SERPL CALCULATED.3IONS-SCNC: 8 MMOL/L (ref 4–13)
AST SERPL W P-5'-P-CCNC: 13 U/L (ref 5–45)
BASOPHILS # BLD AUTO: 0.06 THOUSANDS/ΜL (ref 0–0.1)
BASOPHILS NFR BLD AUTO: 1 % (ref 0–1)
BILIRUB SERPL-MCNC: 0.43 MG/DL (ref 0.2–1)
BUN SERPL-MCNC: 15 MG/DL (ref 5–25)
CALCIUM SERPL-MCNC: 9.3 MG/DL (ref 8.3–10.1)
CHLORIDE SERPL-SCNC: 108 MMOL/L (ref 100–108)
CO2 SERPL-SCNC: 24 MMOL/L (ref 21–32)
CREAT SERPL-MCNC: 0.74 MG/DL (ref 0.6–1.3)
EOSINOPHIL # BLD AUTO: 0.29 THOUSAND/ΜL (ref 0–0.61)
EOSINOPHIL NFR BLD AUTO: 6 % (ref 0–6)
ERYTHROCYTE [DISTWIDTH] IN BLOOD BY AUTOMATED COUNT: 12.6 % (ref 11.6–15.1)
GFR SERPL CREATININE-BSD FRML MDRD: 100 ML/MIN/1.73SQ M
GLUCOSE P FAST SERPL-MCNC: 99 MG/DL (ref 65–99)
HCT VFR BLD AUTO: 36 % (ref 34.8–46.1)
HGB BLD-MCNC: 11.4 G/DL (ref 11.5–15.4)
IMM GRANULOCYTES # BLD AUTO: 0.03 THOUSAND/UL (ref 0–0.2)
IMM GRANULOCYTES NFR BLD AUTO: 1 % (ref 0–2)
LYMPHOCYTES # BLD AUTO: 1.26 THOUSANDS/ΜL (ref 0.6–4.47)
LYMPHOCYTES NFR BLD AUTO: 24 % (ref 14–44)
MCH RBC QN AUTO: 27.7 PG (ref 26.8–34.3)
MCHC RBC AUTO-ENTMCNC: 31.7 G/DL (ref 31.4–37.4)
MCV RBC AUTO: 87 FL (ref 82–98)
MONOCYTES # BLD AUTO: 0.54 THOUSAND/ΜL (ref 0.17–1.22)
MONOCYTES NFR BLD AUTO: 10 % (ref 4–12)
NEUTROPHILS # BLD AUTO: 3.13 THOUSANDS/ΜL (ref 1.85–7.62)
NEUTS SEG NFR BLD AUTO: 58 % (ref 43–75)
NRBC BLD AUTO-RTO: 0 /100 WBCS
PLATELET # BLD AUTO: 285 THOUSANDS/UL (ref 149–390)
PMV BLD AUTO: 12.5 FL (ref 8.9–12.7)
POTASSIUM SERPL-SCNC: 3.8 MMOL/L (ref 3.5–5.3)
PROT SERPL-MCNC: 8.3 G/DL (ref 6.4–8.2)
RBC # BLD AUTO: 4.12 MILLION/UL (ref 3.81–5.12)
SODIUM SERPL-SCNC: 140 MMOL/L (ref 136–145)
TSH SERPL DL<=0.05 MIU/L-ACNC: 2.25 UIU/ML (ref 0.36–3.74)
WBC # BLD AUTO: 5.31 THOUSAND/UL (ref 4.31–10.16)

## 2019-08-08 PROCEDURE — 99204 OFFICE O/P NEW MOD 45 MIN: CPT | Performed by: PHYSICIAN ASSISTANT

## 2019-08-08 PROCEDURE — 82306 VITAMIN D 25 HYDROXY: CPT

## 2019-08-08 PROCEDURE — 36415 COLL VENOUS BLD VENIPUNCTURE: CPT

## 2019-08-08 PROCEDURE — 84443 ASSAY THYROID STIM HORMONE: CPT

## 2019-08-08 PROCEDURE — 85025 COMPLETE CBC W/AUTO DIFF WBC: CPT

## 2019-08-08 PROCEDURE — 80053 COMPREHEN METABOLIC PANEL: CPT

## 2019-08-08 PROCEDURE — 3008F BODY MASS INDEX DOCD: CPT | Performed by: PHYSICIAN ASSISTANT

## 2019-08-08 RX ORDER — ALBUTEROL SULFATE 90 UG/1
2 AEROSOL, METERED RESPIRATORY (INHALATION) EVERY 6 HOURS PRN
Qty: 2 INHALER | Refills: 1 | Status: SHIPPED | OUTPATIENT
Start: 2019-08-08 | End: 2019-11-08 | Stop reason: SDUPTHER

## 2019-08-08 RX ORDER — BUDESONIDE AND FORMOTEROL FUMARATE DIHYDRATE 160; 4.5 UG/1; UG/1
2 AEROSOL RESPIRATORY (INHALATION) 2 TIMES DAILY
Qty: 30.6 G | Refills: 2 | Status: SHIPPED | OUTPATIENT
Start: 2019-08-08 | End: 2019-11-08 | Stop reason: SDUPTHER

## 2019-08-08 NOTE — PATIENT INSTRUCTIONS
Cubital Tunnel Syndrome   WHAT YOU NEED TO KNOW:   Cubital tunnel syndrome is a condition where there is increased pressure on the ulnar nerve in your elbow  The ulnar nerve controls muscles and feeling in the hand  Cubital tunnel syndrome may be caused by direct pressure, stretching, or decreased blood flow to the ulnar nerve  DISCHARGE INSTRUCTIONS:   Medicines:   · NSAIDs:  These medicines decrease swelling and pain  NSAIDs are available without a doctor's order  Ask which medicine is right for you and how much to take  Take as directed  NSAIDs can cause stomach bleeding or kidney problems if not taken correctly  · Take your medicine as directed  Contact your healthcare provider if you think your medicine is not helping or if you have side effects  Tell him of her if you are allergic to any medicine  Keep a list of the medicines, vitamins, and herbs you take  Include the amounts, and when and why you take them  Bring the list or the pill bottles to follow-up visits  Carry your medicine list with you in case of an emergency  Follow up with your healthcare provider as directed:  Write down your questions so you remember to ask them during your visits  Manage your symptoms:   · Avoid putting pressure on your elbow:  Certain positions put pressure on the ulnar nerve in your elbow  Leaning or sleeping on your bent elbow can make your symptoms worse  · Apply ice:  Ice helps decrease swelling and pain  Ice may also help prevent tissue damage  Use an ice pack or put crushed ice in a plastic bag  Cover the ice pack with a towel and place it on the area for 15 to 20 minutes every hour  · Rest your arm:  You may need to rest your injured arm and avoid activities that cause your symptoms to allow your nerve to heal     · Get physical therapy:  A physical therapist can show you exercises to help improve movement and strength  Physical therapy can also help decrease pain and loss of function      · Use elbow splint or brace: You may need a brace or splint on your elbow to decrease your arm movement  This will help to keep pressure off your ulnar nerve  You may also need elbow pads to protect your elbow  Contact your healthcare provider if:   · Your symptoms get worse  · Your hand and fingers are so weak that you cannot grab, squeeze, or lift items  · You have questions or concerns about your condition or care  Return to the emergency department if:   · You suddenly lose feeling in your hand or fingers  · You cannot move your ring or little finger  © 2017 [facility] Information is for End User's use only and may not be sold, redistributed or otherwise used for commercial purposes  All illustrations and images included in CareNotes® are the copyrighted property of [employer]  or [employer]  The above information is an  only  It is not intended as medical advice for individual conditions or treatments  Talk to your doctor, nurse or pharmacist before following any medical regimen to see if it is safe and effective for you

## 2019-08-08 NOTE — PROGRESS NOTES
New Patient    Heena Crockett 45 y o  female   Date:  8/8/2019    Assessment and Plan:    Tia Conner was seen today for establish care and shoulder pain  Diagnoses and all orders for this visit:    Encounter to establish care    Mild persistent asthma without complication  -     budesonide-formoterol (SYMBICORT) 160-4 5 mcg/act inhaler; Inhale 2 puffs 2 (two) times a day  -     albuterol (PROVENTIL HFA,VENTOLIN HFA) 90 mcg/act inhaler; Inhale 2 puffs every 6 (six) hours as needed for wheezing or shortness of breath    Chronic bilateral low back pain with right-sided sciatica  -     Cancel: Ambulatory referral to Physical Therapy; Future  -     XR shoulder 2+ vw right; Future  -     Ambulatory referral to Physical Therapy; Future    Decreased right shoulder range of motion  -     XR shoulder 2+ vw right; Future  -     Ambulatory referral to Physical Therapy; Future    Strain of right shoulder, sequela  -     XR shoulder 2+ vw right; Future  -     Ambulatory referral to Physical Therapy; Future    Chronic right shoulder pain  -     XR shoulder 2+ vw right; Future  -     Ambulatory referral to Physical Therapy; Future    Vitamin D deficiency  -     Vitamin D 25 hydroxy; Future    History of anemia  -     CBC and differential; Future    Weight gain  -     TSH, 3rd generation with Free T4 reflex; Future  -     Comprehensive metabolic panel; Future    Family history of thyroid disorder  -     TSH, 3rd generation with Free T4 reflex; Future    Thyroid disorder screen  -     TSH, 3rd generation with Free T4 reflex; Future              HPI:  Chief Complaint   Patient presents with   Yuliana Grant Establish Care    Shoulder Pain     right x +1 year     HPI   Patient is a 44 yo female who presents to establish care  She switched her job in March and insurance changed  She used to take linzess 145 for IBS with constipation  She sees GI - [facility]  She has hx of asthma since childhood  She has chronic wheezing with exercise and allergies  She has gained about 20 lbs this year  She goes for mammogram through Dr Adiel Tran every January  This next one is already scheduled  She recently was doing Crossfit  She took a break for 3 months  She felt her back hurt and felt she couldn't walk  She got a standing desk for work  She started having pain in her hands, mostly her in her last two fingers and forearm  She stated with a stabbing pain in shoulder when reaching up and throwing frisbee  She noticed a lot of discomfort with external rotation of arm  She is R handed  She had wellness labs for work in July and cholesterol was good  ROS: Review of Systems   Constitutional: Positive for unexpected weight change (wt gain)  Negative for chills, diaphoresis, fatigue and fever  Respiratory: Positive for shortness of breath  Negative for cough, chest tightness (due to asthma) and wheezing  Cardiovascular: Negative  Gastrointestinal: Positive for constipation (hx of as noted in hpi)  Negative for abdominal pain, nausea and vomiting  Genitourinary: Negative  Musculoskeletal: Positive for arthralgias and myalgias  Negative for gait problem, joint swelling and neck pain  Skin: Negative  Allergic/Immunologic: Positive for environmental allergies  Neurological: Negative  Hematological: Negative          Past Medical History:   Diagnosis Date    Anemia     Asthma     History of transfusion     approx 10 yrs ago    Low back pain     Neck pain     Wears glasses      Patient Active Problem List   Diagnosis    Anxiety    Bilateral fibrocystic breast changes    Extrinsic asthma without status asthmaticus    Fibroadenosis of breast    Gastroesophageal reflux disease without esophagitis    IBS (irritable bowel syndrome)    Macromastia       Past Surgical History:   Procedure Laterality Date    BREAST LUMPECTOMY      COLONOSCOPY      DILATION AND CURETTAGE OF UTERUS      ESOPHAGOGASTRODUODENOSCOPY      EYE SURGERY      HYSTERECTOMY age 37    OOPHORECTOMY Left     age 37    OVARY SURGERY      OK REDUCTION OF LARGE BREAST Bilateral 9/19/2017    Procedure: REDUCTION MAMMOPLASTY BREAST;  Surgeon: Jennyfer Blue MD;  Location: [facility] OR;  Service: Plastics    REDUCTION MAMMAPLASTY Bilateral 2017       Social History     Socioeconomic History    Marital status: /Civil Union     Spouse name: None    Number of children: None    Years of education: None    Highest education level: None   Occupational History    None   Social Needs    Financial resource strain: None    Food insecurity:     Worry: None     Inability: None    Transportation needs:     Medical: None     Non-medical: None   Tobacco Use    Smoking status: Never Smoker    Smokeless tobacco: Never Used   Substance and Sexual Activity    Alcohol use: Yes     Frequency: Monthly or less     Drinks per session: 1 or 2     Binge frequency: Never     Comment: social    Drug use: No    Sexual activity: None   Lifestyle    Physical activity:     Days per week: None     Minutes per session: None    Stress: None   Relationships    Social connections:     Talks on phone: None     Gets together: None     Attends Yazidism service: None     Active member of club or organization: None     Attends meetings of clubs or organizations: None     Relationship status: None    Intimate partner violence:     Fear of current or ex partner: None     Emotionally abused: None     Physically abused: None     Forced sexual activity: None   Other Topics Concern    None   Social History Narrative    None       Family History   Problem Relation Age of Onset    Breast cancer Mother 54    Ovarian cancer Sister 45    Breast cancer Maternal Aunt 48    Diabetes Father        No Known Allergies      Current Outpatient Medications:     ibuprofen (ADVIL) 200 mg tablet, Take by mouth every 6 (six) hours as needed for mild pain, Disp: , Rfl:     albuterol (PROVENTIL HFA,VENTOLIN HFA) 90 mcg/act inhaler, Inhale 2 puffs every 6 (six) hours as needed for wheezing or shortness of breath, Disp: 2 Inhaler, Rfl: 1    budesonide-formoterol (SYMBICORT) 160-4 5 mcg/act inhaler, Inhale 2 puffs 2 (two) times a day, Disp: 30 6 g, Rfl: 2    omeprazole (PRILOSEC) 40 MG capsule, Take 40 mg by mouth daily , Disp: , Rfl:       Physical Exam:  /68   Pulse 88   Temp 98 3 °F (36 8 °C)   Resp 16   Ht 5' 3" (1 6 m)   Wt 76 7 kg (169 lb)   SpO2 97%   BMI 29 94 kg/m²     Physical Exam   Constitutional: She is oriented to person, place, and time  Vital signs are normal  She appears well-developed and well-nourished  No distress  HENT:   Head: Normocephalic and atraumatic  Right Ear: Tympanic membrane, external ear and ear canal normal    Left Ear: Tympanic membrane, external ear and ear canal normal    Nose: Nose normal    Mouth/Throat: Oropharynx is clear and moist    Eyes: Pupils are equal, round, and reactive to light  Conjunctivae and lids are normal    Neck: Trachea normal and normal range of motion  Neck supple  No thyromegaly present  Cardiovascular: Normal rate, regular rhythm, S1 normal, S2 normal and intact distal pulses  Exam reveals no gallop  No murmur heard  Pulmonary/Chest: Breath sounds normal  No respiratory distress  She has no wheezes  She has no rhonchi  She has no rales  Abdominal: Soft  Normal appearance and bowel sounds are normal  She exhibits no mass  There is no hepatosplenomegaly  There is no tenderness  Musculoskeletal: She exhibits no edema or deformity  R shoulder reduced ROM above head, pain aggravated with external rotation   Lymphadenopathy:     She has no cervical adenopathy  Neurological: She is alert and oriented to person, place, and time  She has normal reflexes  No cranial nerve deficit or sensory deficit  Skin: Skin is warm and dry  No rash noted  No cyanosis  No pallor  Nails show no clubbing  Psychiatric: She has a normal mood and affect   Her behavior is normal  Cognition and memory are normal          Labs:  Lab Results   Component Value Date    WBC 4 64 11/15/2016    HGB 12 2 11/15/2016    HCT 37 2 11/15/2016    MCV 89 11/15/2016     11/15/2016     No results found for: NA, K, CL, CO2, ANIONGAP, BUN, CREATININE, GLUCOSE, GLUF, CALCIUM, CORRECTEDCA, AST, ALT, ALKPHOS, PROT, BILITOT, EGFR

## 2019-08-09 ENCOUNTER — TELEPHONE (OUTPATIENT)
Dept: FAMILY MEDICINE CLINIC | Facility: CLINIC | Age: 44
End: 2019-08-09

## 2019-08-12 ENCOUNTER — APPOINTMENT (OUTPATIENT)
Dept: PHYSICAL THERAPY | Facility: CLINIC | Age: 44
End: 2019-08-12
Payer: COMMERCIAL

## 2019-08-17 ENCOUNTER — APPOINTMENT (OUTPATIENT)
Dept: RADIOLOGY | Age: 44
End: 2019-08-17
Payer: COMMERCIAL

## 2019-08-17 DIAGNOSIS — M54.41 CHRONIC BILATERAL LOW BACK PAIN WITH RIGHT-SIDED SCIATICA: ICD-10-CM

## 2019-08-17 DIAGNOSIS — S46.911S STRAIN OF RIGHT SHOULDER, SEQUELA: ICD-10-CM

## 2019-08-17 DIAGNOSIS — G89.29 CHRONIC BILATERAL LOW BACK PAIN WITH RIGHT-SIDED SCIATICA: ICD-10-CM

## 2019-08-17 DIAGNOSIS — M25.611 DECREASED RIGHT SHOULDER RANGE OF MOTION: ICD-10-CM

## 2019-08-17 DIAGNOSIS — G89.29 CHRONIC RIGHT SHOULDER PAIN: ICD-10-CM

## 2019-08-17 DIAGNOSIS — M25.511 CHRONIC RIGHT SHOULDER PAIN: ICD-10-CM

## 2019-08-17 PROCEDURE — 73030 X-RAY EXAM OF SHOULDER: CPT

## 2019-08-19 ENCOUNTER — EVALUATION (OUTPATIENT)
Dept: PHYSICAL THERAPY | Facility: CLINIC | Age: 44
End: 2019-08-19
Payer: COMMERCIAL

## 2019-08-19 DIAGNOSIS — M54.50 CHRONIC RIGHT-SIDED LOW BACK PAIN WITHOUT SCIATICA: ICD-10-CM

## 2019-08-19 DIAGNOSIS — G89.29 CHRONIC RIGHT-SIDED LOW BACK PAIN WITHOUT SCIATICA: ICD-10-CM

## 2019-08-19 DIAGNOSIS — M25.511 ACUTE PAIN OF RIGHT SHOULDER: Primary | ICD-10-CM

## 2019-08-19 PROCEDURE — 97161 PT EVAL LOW COMPLEX 20 MIN: CPT | Performed by: PHYSICAL THERAPIST

## 2019-08-19 NOTE — PROGRESS NOTES
PT Evaluation     Today's date: 2019  Patient name: Alexis Woodard  : 1975  MRN: [id number]  Referring provider: Carmen Vee  Dx:   Encounter Diagnosis     ICD-10-CM    1  Acute pain of right shoulder M25 511    2  Chronic right-sided low back pain without sciatica M54 5     G89 29                   Assessment  Assessment details: Alexis Woodard is a pleasant 45 y o  female presents with right shoulder pain  Pt presents with decreased tolerance to R shoulder AROM in overhead positions but tolerates motion at side well  Slight tenderness throughout neck musculature  Address motion deficits and strengthen as tolerated  Screen LB sxs at nv to determine plan for that region  No further referral appears necessary at this time  Primary movement diagnosis of decreased ROM and neuromuscular control in overhead positions resulting in symptoms of right shoulder pain and limiting her participation/performance in reaching ahead  Primary impairments include:  1) decreased shoulder flexion PROM--> addressed with stretching  2) decreased shoulder flexion AROM--> addressed with progressive strengthening  3) decreased shoulder scapular stabilization and neuromuscular control--> addressed with NMRE    Skilled PT services appropriate to facilitate return to prior level of function with transition to home exercise program for independent management when appropriate  Impairments: abnormal muscle firing, abnormal muscle tone, abnormal or restricted ROM, impaired physical strength, lacks appropriate home exercise program and pain with function  Understanding of Dx/Px/POC: good   Prognosis: good    Goals  Patient will successfully transition to home exercise program   Patient will be able to manage symptoms independently  ST  Pt will be able to lift her arm through full AROM flexion within 4 weeks  2  Pt will increase shoulder flexion PROM by 10 degrees within 4 weeks     LT  Pt will be able to lift 10 pounds over head within 8 weeks with minimal pain  2  Pt will be report minimal pain after work within 8 weeks  Plan  Patient would benefit from: skilled PT  Referral necessary: No  Planned modality interventions: thermotherapy: hydrocollator packs  Planned therapy interventions: home exercise program, manual therapy, neuromuscular re-education, patient education, functional ROM exercises, strengthening, stretching, joint mobilization, graded activity, graded exercise, therapeutic exercise, body mechanics training, motor coordination training and activity modification  Frequency: 2x week  Duration in weeks: 12  Treatment plan discussed with: patient        Subjective Evaluation    History of Present Illness  Mechanism of injury: She goes for mammogram through Dr Tulio Mckeon every January  This next one is already scheduled       Per Gadiel Sotelo PA-C 8/9/19: She recently was doing Crossfit  She took a break for 3 months  She felt her back hurt and felt she couldn't walk  She got a standing desk for work  She started having pain in her hands, mostly her in her last two fingers and forearm  She stated with a stabbing pain in shoulder when reaching up  Pt reports that she was throwing a frisbee and the motion of external rotation shot a pain into the top of the shoulder and down into the lateral arm  She is unable to hold her arm up to do her hair and she feels better when it is supported going through the motion  Pt denies any popping, clicking or locking in the shoulder  Pt denies numbness and tingling into the arm  PT IE 8/19/19:  Pt reports long standing LBP and has standing desk at work that has been helpful to reduce irritation  All pain is localized to the right side LB and she denies any numbness, tingling and bowel/bladder changes  Pt is unable to stand directy upright in the mornings due to pain and has to ease into it   Pt reports doing the rowing machine and causing a lot of pain  Standing is always better than sitting and especially when standing on hard surface  Right shoulder: Pt reports she was reaching down for a piece of paper and put it up on a cabinet with increased pain 1 year ago  She then threw a frisbee over the summer and had a sharp pain in the anterior shoulder with increased pain since  She now get s a sharp pain when lifting her arm overhead  No issues with lifting and moving when her arm is at her side  She used to do crossfit and had a similar pain around the same time performing a hang clean  She believes cross fit was a little too high level for her because she was unsure of appropriate form, causing more pain in shoulder and back      Shoulder pain    Pain  Current pain ratin  At best pain ratin  At worst pain rating: 10  Quality: sharp    Patient Goals  Patient goals for therapy: increased strength, improved balance and increased motion          Objective     Palpation     Additional Palpation Details  Tenderness to UT and scalenes on R compared to left  Neurological Testing     Sensation     Shoulder   Left Shoulder   Intact: light touch    Active Range of Motion   Left Shoulder   Normal active range of motion    Right Shoulder   Flexion: 30 degrees with pain  External rotation 0°: 30 degrees     Passive Range of Motion   Left Shoulder   Normal passive range of motion    Right Shoulder   Flexion: 165 degrees   Extension: WFL  Abduction: 140 degrees   External rotation 0°: 35 degrees   Internal rotation 0°: 30 degrees     Strength/Myotome Testing     Left Shoulder   Normal muscle strength    Right Shoulder     Planes of Motion   Flexion: 2+   Extension: 4-   External rotation at 0°: 4+   Internal rotation at 0°: 4+     Tests     Right Shoulder   Positive Hawkin's, painful arc and passive horizontal adduction  Negative apprehension, belly press, external rotation lag sign and ULTT1                Precautions: none      Manual multidirection shoulder stretching             Right scalene/ut STM                                                        Exercise Diary              Table slides scaption             UT active elongation stretch             scap sets with TB             Shoulder adduction TB             Shoulder extension TB             pulleys             Supine scap punch                                                                                                                                                                                          Modalities              h-wave low setting             P

## 2019-08-22 ENCOUNTER — APPOINTMENT (OUTPATIENT)
Dept: PHYSICAL THERAPY | Facility: CLINIC | Age: 44
End: 2019-08-22
Payer: COMMERCIAL

## 2019-08-26 ENCOUNTER — APPOINTMENT (OUTPATIENT)
Dept: PHYSICAL THERAPY | Facility: CLINIC | Age: 44
End: 2019-08-26
Payer: COMMERCIAL

## 2019-08-29 ENCOUNTER — APPOINTMENT (OUTPATIENT)
Dept: PHYSICAL THERAPY | Facility: CLINIC | Age: 44
End: 2019-08-29
Payer: COMMERCIAL

## 2019-09-13 ENCOUNTER — TELEPHONE (OUTPATIENT)
Dept: FAMILY MEDICINE CLINIC | Facility: CLINIC | Age: 44
End: 2019-09-13

## 2019-09-13 NOTE — TELEPHONE ENCOUNTER
LMOM for patient to call back to reschedule no call no show #1  Patient advised of no call no show policy of office

## 2019-11-04 ENCOUNTER — OFFICE VISIT (OUTPATIENT)
Dept: OBGYN CLINIC | Facility: HOSPITAL | Age: 44
End: 2019-11-04
Payer: COMMERCIAL

## 2019-11-04 VITALS
HEART RATE: 76 BPM | WEIGHT: 174 LBS | HEIGHT: 63 IN | DIASTOLIC BLOOD PRESSURE: 75 MMHG | SYSTOLIC BLOOD PRESSURE: 129 MMHG | BODY MASS INDEX: 30.83 KG/M2

## 2019-11-04 DIAGNOSIS — M25.511 ACUTE PAIN OF RIGHT SHOULDER: ICD-10-CM

## 2019-11-04 DIAGNOSIS — M75.01 ADHESIVE CAPSULITIS OF RIGHT SHOULDER: Primary | ICD-10-CM

## 2019-11-04 PROCEDURE — 99204 OFFICE O/P NEW MOD 45 MIN: CPT | Performed by: ORTHOPAEDIC SURGERY

## 2019-11-04 PROCEDURE — 20610 DRAIN/INJ JOINT/BURSA W/O US: CPT | Performed by: ORTHOPAEDIC SURGERY

## 2019-11-04 RX ORDER — BUPIVACAINE HYDROCHLORIDE 2.5 MG/ML
2 INJECTION, SOLUTION INFILTRATION; PERINEURAL
Status: COMPLETED | OUTPATIENT
Start: 2019-11-04 | End: 2019-11-04

## 2019-11-04 RX ORDER — BETAMETHASONE SODIUM PHOSPHATE AND BETAMETHASONE ACETATE 3; 3 MG/ML; MG/ML
6 INJECTION, SUSPENSION INTRA-ARTICULAR; INTRALESIONAL; INTRAMUSCULAR; SOFT TISSUE
Status: COMPLETED | OUTPATIENT
Start: 2019-11-04 | End: 2019-11-04

## 2019-11-04 RX ADMIN — BUPIVACAINE HYDROCHLORIDE 2 ML: 2.5 INJECTION, SOLUTION INFILTRATION; PERINEURAL at 15:51

## 2019-11-04 RX ADMIN — BETAMETHASONE SODIUM PHOSPHATE AND BETAMETHASONE ACETATE 6 MG: 3; 3 INJECTION, SUSPENSION INTRA-ARTICULAR; INTRALESIONAL; INTRAMUSCULAR; SOFT TISSUE at 15:51

## 2019-11-04 NOTE — PROGRESS NOTES
Assessment  Diagnoses and all orders for this visit:    Adhesive capsulitis of right shoulder    Acute pain of right shoulder        Discussion and Plan:    The patient has an examination consistent with adhesive capsulitis of the right shoulder  I have discussed with the patient the pathophysiology of this diagnosis and reviewed how the examination correlates with this diagnosis  Treatment options were discussed at length and after discussing these treatment options, the patient elected to receive a glenohumeral injection of corticosteroid (as described in the procedure note) with a prescription for referral to physical therapy  Explained to the patient that this can take 6-9 mos to improve from the start of treatment  If the patient improves her range of motion and continues to be symptomatic then we would recommend further imaging the form of an MRI arthrogram of the right shoulder    Subjective:   Patient ID: Julieta Solano is a 45 y o  female      HPI  The patient presents with a chief complaint of right shoulder pain  The pain began 1 year(s) ago and is associated with an acute injury  Patient was lifting a kettle bell at cross fit when she felt a sharp pain in the right shoulder  Patient tried physical therapy but was unable to continue due to increased pain after  She has continue her home exercise program without benefit  The patient describes the pain as sharp in intensity,  intermittent in timing, and localizes the pain to the  right globally  The pain is worse with overhead work and raising arm over head and relieved by rest   The pain is not associated with numbness and tingling  The pain is not associated with constitutional symptoms  The patient is awoken at night by the pain            The following portions of the patient's history were reviewed and updated as appropriate: allergies, current medications, past family history, past medical history, past social history, past surgical history and problem list     Review of Systems   Constitutional: Negative for chills and fever  HENT: Negative for drooling and sneezing  Eyes: Negative for redness  Respiratory: Negative for cough and wheezing  Gastrointestinal: Negative for nausea and vomiting  Musculoskeletal:        Please see ortho exam   Psychiatric/Behavioral: Negative for behavioral problems  The patient is not nervous/anxious  Objective:  /75   Pulse 76   Ht 5' 3" (1 6 m)   Wt 78 9 kg (174 lb)   BMI 30 82 kg/m²       Right Shoulder Exam     Tenderness   Right shoulder tenderness location: anterior deltoid  Range of Motion   Passive abduction: 70   External rotation: 20   Forward flexion: 110   Internal rotation 0 degrees: Sacrum     Other   Erythema: absent  Sensation: normal  Pulse: present    Comments:    Exam today is limited by pain            Physical Exam   Constitutional: She is oriented to person, place, and time  She appears well-developed and well-nourished  Eyes: Pupils are equal, round, and reactive to light  Pulmonary/Chest: Effort normal and breath sounds normal    Neurological: She is alert and oriented to person, place, and time  Skin: Skin is warm and dry  Psychiatric: She has a normal mood and affect  Her behavior is normal  Judgment and thought content normal    Vitals reviewed      Large joint arthrocentesis: R glenohumeral  Date/Time: 11/4/2019 3:51 PM  Consent given by: parent  Site marked: site marked  Timeout: Immediately prior to procedure a time out was called to verify the correct patient, procedure, equipment, support staff and site/side marked as required   Supporting Documentation  Indications: pain   Procedure Details  Location: shoulder - R glenohumeral  Preparation: Patient was prepped and draped in the usual sterile fashion  Needle size: 22 G  Ultrasound guidance: no  Approach: posterior  Medications administered: 2 mL bupivacaine 0 25 %; 6 mg betamethasone acetate-betamethasone sodium phosphate 6 (3-3) mg/mL    Patient tolerance: patient tolerated the procedure well with no immediate complications  Dressing:  Sterile dressing applied          I have personally reviewed pertinent films in PACS and my interpretation is as follows    Right shoulder x-rays demonstrate no fracture or dislocation    Scribe Attestation    I,:   Richard Ocampo am acting as a scribe while in the presence of the attending physician :        I,:   Nestor Randall MD personally performed the services described in this documentation    as scribed in my presence :

## 2019-11-04 NOTE — PATIENT INSTRUCTIONS

## 2019-11-08 ENCOUNTER — OFFICE VISIT (OUTPATIENT)
Dept: FAMILY MEDICINE CLINIC | Facility: CLINIC | Age: 44
End: 2019-11-08
Payer: COMMERCIAL

## 2019-11-08 VITALS
DIASTOLIC BLOOD PRESSURE: 70 MMHG | SYSTOLIC BLOOD PRESSURE: 110 MMHG | BODY MASS INDEX: 30.29 KG/M2 | TEMPERATURE: 97.9 F | HEART RATE: 86 BPM | OXYGEN SATURATION: 96 % | RESPIRATION RATE: 17 BRPM | WEIGHT: 171 LBS

## 2019-11-08 DIAGNOSIS — J45.30 MILD PERSISTENT ASTHMA WITHOUT COMPLICATION: ICD-10-CM

## 2019-11-08 DIAGNOSIS — J06.9 URI WITH COUGH AND CONGESTION: Primary | ICD-10-CM

## 2019-11-08 PROCEDURE — 99213 OFFICE O/P EST LOW 20 MIN: CPT | Performed by: PHYSICIAN ASSISTANT

## 2019-11-08 RX ORDER — BENZONATATE 200 MG/1
200 CAPSULE ORAL 3 TIMES DAILY PRN
Qty: 30 CAPSULE | Refills: 0 | Status: SHIPPED | OUTPATIENT
Start: 2019-11-08 | End: 2020-04-20

## 2019-11-08 RX ORDER — ALBUTEROL SULFATE 90 UG/1
2 AEROSOL, METERED RESPIRATORY (INHALATION) EVERY 6 HOURS PRN
Qty: 2 INHALER | Refills: 1 | Status: SHIPPED | OUTPATIENT
Start: 2019-11-08 | End: 2020-04-02

## 2019-11-08 RX ORDER — LORATADINE 10 MG/1
10 TABLET ORAL DAILY PRN
Qty: 30 TABLET | Refills: 0 | Status: SHIPPED | OUTPATIENT
Start: 2019-11-08 | End: 2020-04-20

## 2019-11-08 RX ORDER — BUDESONIDE AND FORMOTEROL FUMARATE DIHYDRATE 160; 4.5 UG/1; UG/1
2 AEROSOL RESPIRATORY (INHALATION) 2 TIMES DAILY
Qty: 30.6 G | Refills: 2 | Status: SHIPPED | OUTPATIENT
Start: 2019-11-08

## 2019-11-08 NOTE — PROGRESS NOTES
Assessment/Plan:      Diagnoses and all orders for this visit:    URI with cough and congestion  -     benzonatate (TESSALON) 200 MG capsule; Take 1 capsule (200 mg total) by mouth 3 (three) times a day as needed for cough  -     loratadine (CLARITIN) 10 mg tablet; Take 1 tablet (10 mg total) by mouth daily as needed for allergies (congestion)  - supportive care  - viral, afebrile, clear lung exam, stable vitals  - hydration, prn tylenol or motrin   - restart symbicort and prn albuterol inhaler     Mild persistent asthma without complication  -     albuterol (PROVENTIL HFA,VENTOLIN HFA) 90 mcg/act inhaler; Inhale 2 puffs every 6 (six) hours as needed for wheezing or shortness of breath  -     budesonide-formoterol (SYMBICORT) 160-4 5 mcg/act inhaler; Inhale 2 puffs 2 (two) times a day          Subjective:     Patient ID: Edwina Gordon is a 45 y o  female  Chief Complaint   Patient presents with    Sore Throat     since wednesday afternoon     Earache     both     HPI   Patient is a 46 yo female who presents for sick visit  Patient has hx of mild persistent asthma and has been out of her Symbicort and rescue inhaler due to cost of coverage, she would like to try and send refills and is going to bring a coupon from online  She started feeling sick on Wednesday with headache, "neck feeling swollen", congestion, earache and itchiness  Her chest feels tight  She is eating well  No fever, chills  She was taking nyquil at night  Review of Systems   Constitutional: Positive for fatigue  Negative for appetite change, chills and fever  HENT: Positive for congestion, ear pain, rhinorrhea and sore throat  Negative for ear discharge, sinus pressure, trouble swallowing and voice change  Eyes: Negative  Respiratory: Positive for cough, chest tightness and wheezing  Negative for shortness of breath  Cardiovascular: Negative for chest pain, palpitations and leg swelling  Gastrointestinal: Negative      Skin: Negative  Neurological: Negative  Objective:  Vitals:    11/08/19 1252   BP: 110/70   Pulse: 86   Resp: 17   Temp: 97 9 °F (36 6 °C)   SpO2: 96%      Physical Exam   Constitutional: She is oriented to person, place, and time  She appears well-developed and well-nourished  Non-toxic appearance  She does not appear ill  No distress  HENT:   Head: Normocephalic and atraumatic  Right Ear: Tympanic membrane, external ear and ear canal normal    Left Ear: Tympanic membrane, external ear and ear canal normal    Nose: Mucosal edema and rhinorrhea present  Right sinus exhibits no maxillary sinus tenderness and no frontal sinus tenderness  Left sinus exhibits no maxillary sinus tenderness and no frontal sinus tenderness  Mouth/Throat: Uvula is midline, oropharynx is clear and moist and mucous membranes are normal    Eyes: Conjunctivae are normal    Neck: Normal range of motion  Neck supple  Cardiovascular: Normal rate, regular rhythm, normal heart sounds and intact distal pulses  No murmur heard  Pulmonary/Chest: Effort normal and breath sounds normal  No stridor  No respiratory distress  She has no wheezes  She has no rales  Musculoskeletal: She exhibits no edema or deformity  Lymphadenopathy:     She has no cervical adenopathy  Neurological: She is alert and oriented to person, place, and time  No cranial nerve deficit  Skin: Skin is warm and dry  No rash noted  Psychiatric: She has a normal mood and affect   Her behavior is normal

## 2019-11-11 ENCOUNTER — OFFICE VISIT (OUTPATIENT)
Dept: URGENT CARE | Age: 44
End: 2019-11-11
Payer: COMMERCIAL

## 2019-11-11 VITALS
HEART RATE: 98 BPM | BODY MASS INDEX: 30.91 KG/M2 | RESPIRATION RATE: 20 BRPM | WEIGHT: 168 LBS | OXYGEN SATURATION: 100 % | HEIGHT: 62 IN | DIASTOLIC BLOOD PRESSURE: 78 MMHG | SYSTOLIC BLOOD PRESSURE: 111 MMHG | TEMPERATURE: 98.9 F

## 2019-11-11 DIAGNOSIS — J45.909 UNCOMPLICATED ASTHMA, UNSPECIFIED ASTHMA SEVERITY, UNSPECIFIED WHETHER PERSISTENT: Primary | ICD-10-CM

## 2019-11-11 DIAGNOSIS — J01.90 ACUTE SINUSITIS, RECURRENCE NOT SPECIFIED, UNSPECIFIED LOCATION: ICD-10-CM

## 2019-11-11 DIAGNOSIS — J40 BRONCHITIS: ICD-10-CM

## 2019-11-11 PROCEDURE — 99213 OFFICE O/P EST LOW 20 MIN: CPT | Performed by: PHYSICIAN ASSISTANT

## 2019-11-11 RX ORDER — AZITHROMYCIN 250 MG/1
TABLET, FILM COATED ORAL
Qty: 6 TABLET | Refills: 0 | Status: SHIPPED | OUTPATIENT
Start: 2019-11-11 | End: 2019-11-15

## 2019-11-11 RX ORDER — ALBUTEROL SULFATE 2.5 MG/3ML
2.5 SOLUTION RESPIRATORY (INHALATION) EVERY 6 HOURS PRN
Qty: 40 VIAL | Refills: 0 | Status: SHIPPED | OUTPATIENT
Start: 2019-11-11

## 2019-11-11 RX ORDER — ALBUTEROL SULFATE 90 UG/1
2 AEROSOL, METERED RESPIRATORY (INHALATION) EVERY 6 HOURS PRN
Qty: 8.5 G | Refills: 0 | Status: SHIPPED | OUTPATIENT
Start: 2019-11-11 | End: 2020-04-02

## 2019-11-11 RX ORDER — PREDNISONE 10 MG/1
TABLET ORAL
Qty: 30 TABLET | Refills: 0 | Status: SHIPPED | OUTPATIENT
Start: 2019-11-11 | End: 2020-02-06

## 2019-11-11 RX ORDER — ALBUTEROL SULFATE 2.5 MG/3ML
2.5 SOLUTION RESPIRATORY (INHALATION) ONCE
Status: COMPLETED | OUTPATIENT
Start: 2019-11-11 | End: 2019-11-11

## 2019-11-11 RX ORDER — BENZONATATE 100 MG/1
100 CAPSULE ORAL 3 TIMES DAILY PRN
Qty: 20 CAPSULE | Refills: 0 | Status: SHIPPED | OUTPATIENT
Start: 2019-11-11 | End: 2020-04-02

## 2019-11-11 RX ADMIN — ALBUTEROL SULFATE 2.5 MG: 2.5 SOLUTION RESPIRATORY (INHALATION) at 12:32

## 2019-11-11 NOTE — PROGRESS NOTES
[facility]        NAME: Lluvia Avelar is a 45 y o  female  : 1975    MRN: [id number]  DATE: 2019  TIME: 1:01 PM    /78   Pulse 98   Temp 98 9 °F (37 2 °C)   Resp 20   Ht 5' 2" (1 575 m)   Wt 76 2 kg (168 lb)   SpO2 100%   BMI 30 73 kg/m²     Assessment and Plan   Uncomplicated asthma, unspecified asthma severity, unspecified whether persistent [J45 [id number]]  1  Uncomplicated asthma, unspecified asthma severity, unspecified whether persistent  albuterol inhalation solution 2 5 mg   2  Acute sinusitis, recurrence not specified, unspecified location  azithromycin (ZITHROMAX) 250 mg tablet    albuterol (2 5 mg/3 mL) 0 083 % nebulizer solution    benzonatate (TESSALON PERLES) 100 mg capsule    albuterol (PROAIR HFA) 90 mcg/act inhaler    predniSONE 10 mg tablet   3  Bronchitis  azithromycin (ZITHROMAX) 250 mg tablet    albuterol (2 5 mg/3 mL) 0 083 % nebulizer solution    benzonatate (TESSALON PERLES) 100 mg capsule    albuterol (PROAIR HFA) 90 mcg/act inhaler    predniSONE 10 mg tablet         Patient Instructions       Follow up with PCP in 3-5 days  Proceed to  ER if symptoms worsen  Chief Complaint     Chief Complaint   Patient presents with    Cold Like Symptoms     Pt c/o cough, congestion, chest tightness, feeling SOB since Friday  Pt was seen 19 by PCP and dx'd with URI and rx'd benzonatate pearls for symptoms  Denies fever  Pt has also been taking Robitussin and Advil for symptoms  History of Present Illness       Pt with cough and wheezing for 3-4 days     Cough   This is a new problem  The current episode started in the past 7 days  The problem has been unchanged  The problem occurs constantly  The cough is productive of sputum  Pertinent negatives include no chest pain, chills, ear congestion, ear pain, fever, headaches, heartburn, hemoptysis, myalgias, nasal congestion, postnasal drip or rash  Nothing aggravates the symptoms   She has tried nothing for the symptoms  The treatment provided no relief  Her past medical history is significant for asthma  There is no history of bronchiectasis, bronchitis, COPD, emphysema, environmental allergies or pneumonia  Review of Systems   Review of Systems   Constitutional: Negative  Negative for chills and fever  HENT: Negative  Negative for ear pain and postnasal drip  Eyes: Negative  Respiratory: Positive for cough  Negative for hemoptysis  Cardiovascular: Negative  Negative for chest pain  Gastrointestinal: Negative  Negative for heartburn  Endocrine: Negative  Genitourinary: Negative  Musculoskeletal: Negative  Negative for myalgias  Skin: Negative  Negative for rash  Allergic/Immunologic: Negative  Negative for environmental allergies  Neurological: Negative  Negative for headaches  Hematological: Negative  Psychiatric/Behavioral: Negative  All other systems reviewed and are negative          Current Medications       Current Outpatient Medications:     albuterol (2 5 mg/3 mL) 0 083 % nebulizer solution, Take 1 vial (2 5 mg total) by nebulization every 6 (six) hours as needed for wheezing, Disp: 40 vial, Rfl: 0    albuterol (PROAIR HFA) 90 mcg/act inhaler, Inhale 2 puffs every 6 (six) hours as needed for wheezing, Disp: 8 5 g, Rfl: 0    albuterol (PROVENTIL HFA,VENTOLIN HFA) 90 mcg/act inhaler, Inhale 2 puffs every 6 (six) hours as needed for wheezing or shortness of breath, Disp: 2 Inhaler, Rfl: 1    azithromycin (ZITHROMAX) 250 mg tablet, Take 2 tablets today then 1 tablet daily x 4 days, Disp: 6 tablet, Rfl: 0    benzonatate (TESSALON PERLES) 100 mg capsule, Take 1 capsule (100 mg total) by mouth 3 (three) times a day as needed for cough, Disp: 20 capsule, Rfl: 0    benzonatate (TESSALON) 200 MG capsule, Take 1 capsule (200 mg total) by mouth 3 (three) times a day as needed for cough, Disp: 30 capsule, Rfl: 0    budesonide-formoterol (SYMBICORT) 160-4 5 mcg/act inhaler, Inhale 2 puffs 2 (two) times a day, Disp: 30 6 g, Rfl: 2    ibuprofen (ADVIL) 200 mg tablet, Take by mouth every 6 (six) hours as needed for mild pain, Disp: , Rfl:     loratadine (CLARITIN) 10 mg tablet, Take 1 tablet (10 mg total) by mouth daily as needed for allergies (congestion), Disp: 30 tablet, Rfl: 0    omeprazole (PRILOSEC) 40 MG capsule, Take 40 mg by mouth daily , Disp: , Rfl:     predniSONE 10 mg tablet, 5 tabs po qd x 2 days then 4 tabs po qd x 2 days then 3 tabs po qd x 2 days then 2 tabs po qd x 2 days then 1 tab po qd x 2 days, Disp: 30 tablet, Rfl: 0  No current facility-administered medications for this visit  Current Allergies     Allergies as of 11/11/2019    (No Known Allergies)            The following portions of the patient's history were reviewed and updated as appropriate: allergies, current medications, past family history, past medical history, past social history, past surgical history and problem list      Past Medical History:   Diagnosis Date    Anemia     Asthma     History of transfusion     approx 10 yrs ago    Low back pain     Neck pain     Wears glasses        Past Surgical History:   Procedure Laterality Date    BREAST LUMPECTOMY      COLONOSCOPY      DILATION AND CURETTAGE OF UTERUS      ESOPHAGOGASTRODUODENOSCOPY      EYE SURGERY      HYSTERECTOMY      age 37    OOPHORECTOMY Left     age 37    OVARY SURGERY      [place] REDUCTION OF LARGE BREAST Bilateral 9/19/2017    Procedure: REDUCTION MAMMOPLASTY BREAST;  Surgeon: Elke Serrano MD;  Location: [facility] OR;  Service: Plastics    REDUCTION MAMMAPLASTY Bilateral 2017       Family History   Problem Relation Age of Onset    Breast cancer Mother 54    Ovarian cancer Sister 45    Breast cancer Maternal Aunt 48    Diabetes Father          Medications have been verified          Objective   /78   Pulse 98   Temp 98 9 °F (37 2 °C)   Resp 20   Ht 5' 2" (1 575 m)   Wt 76 2 kg (168 lb)   SpO2 100%   BMI 30 73 kg/m²        Physical Exam     Physical Exam   Constitutional: She is oriented to person, place, and time  She appears well-developed and well-nourished  Post neb increase airway cta no rrw  Pt feels better post neb    HENT:   Head: Normocephalic and atraumatic  Right Ear: External ear normal    Left Ear: External ear normal    Mouth/Throat: Oropharynx is clear and moist    Yellow nasal d/c  Coarse sounds all fields  Slight wheeze all fields    Eyes: Pupils are equal, round, and reactive to light  Conjunctivae and EOM are normal    Neck: Normal range of motion  Neck supple  Cardiovascular: Normal rate, regular rhythm and normal heart sounds  Pulmonary/Chest: Effort normal    Abdominal: Soft  Bowel sounds are normal    Musculoskeletal: Normal range of motion  Neurological: She is alert and oriented to person, place, and time  Skin: Skin is warm  Capillary refill takes less than 2 seconds  Psychiatric: She has a normal mood and affect  Her behavior is normal    Nursing note and vitals reviewed

## 2019-11-11 NOTE — PATIENT INSTRUCTIONS
Asthma   WHAT YOU NEED TO KNOW:   Asthma is a lung disease that makes breathing difficult  Chronic inflammation and reactions to triggers narrow the airways in the lungs  Asthma can become life-threatening if it is not managed  DISCHARGE INSTRUCTIONS:   Return to the emergency department if:   · You have severe shortness of breath  · Your lips or nails turn blue or gray  · The skin around your neck and ribs pulls in with each breath  · You have shortness of breath, even after you take your short-term medicine as directed  · Your peak flow numbers are in the red zone of your AAP  Contact your healthcare provider if:   · You run out of medicine before your next refill is due  · Your symptoms get worse  · You need to take more medicine than usual to control your symptoms  · You have questions or concerns about your condition or care  Medicines:   · Medicines  decrease inflammation, open airways, and make it easier to breathe  Medicines may be inhaled, taken as a pill, or injected  Short-term medicines relieve your symptoms quickly  Long-term medicines are used to prevent future attacks  You may also need medicine to help control your allergies  Ask your healthcare provider for more information about the medicine you are given and how to take it safely  · Take your medicine as directed  Contact your healthcare provider if you think your medicine is not helping or if you have side effects  Tell him of her if you are allergic to any medicine  Keep a list of the medicines, vitamins, and herbs you take  Include the amounts, and when and why you take them  Bring the list or the pill bottles to follow-up visits  Carry your medicine list with you in case of an emergency  Follow up with your healthcare provider as directed: You will need to return to make sure your medicine is working and your symptoms are controlled   You may be referred to an asthma specialist  Momo Miller may be asked to keep a record of your peak flow values and bring it with you to your appointments  Write down your questions so you remember to ask them during your visits  Manage your symptoms and prevent future attacks:   · Follow your Asthma Action Plan (AAP)  This is a written plan that you and your healthcare provider create  It explains which medicine you need and when to change doses if necessary  It also explains how you can monitor symptoms and use a peak flow meter  The meter measures how well your lungs are working  · Manage other health conditions , such as allergies, acid reflux, and sleep apnea  · Identify and avoid triggers  These may include pets, dust mites, mold, and cockroaches  · Do not smoke or be around others who smoke  Nicotine and other chemicals in cigarettes and cigars can cause lung damage  Ask your healthcare provider for information if you currently smoke and need help to quit  E-cigarettes or smokeless tobacco still contain nicotine  Talk to your healthcare provider before you use these products  · Ask about the flu vaccine  The flu can make your asthma worse  You may need a yearly flu shot  © 2017 [facility] Information is for End User's use only and may not be sold, redistributed or otherwise used for commercial purposes  All illustrations and images included in CareNotes® are the copyrighted property of [employer] M [employer]  or [employer]  The above information is an  only  It is not intended as medical advice for individual conditions or treatments  Talk to your doctor, nurse or pharmacist before following any medical regimen to see if it is safe and effective for you  Acute Bronchitis   WHAT YOU NEED TO KNOW:   Acute bronchitis is swelling and irritation in the air passages of your lungs  This irritation may cause you to cough or have other breathing problems   Acute bronchitis often starts because of another illness, such as a cold or the flu  The illness spreads from your nose and throat to your windpipe and airways  Bronchitis is often called a chest cold  Acute bronchitis lasts about 3 to 6 weeks and is usually not a serious illness  Your cough can last for several weeks  DISCHARGE INSTRUCTIONS:   Return to the emergency department if:   · You cough up blood  · Your lips or fingernails turn blue  · You feel like you are not getting enough air when you breathe  Contact your healthcare provider if:   · You have a fever  · Your breathing problems do not go away or get worse  · Your cough does not get better within 4 weeks  · You have questions or concerns about your condition or care  Self-care:   · Get more rest   Rest helps your body to heal  Slowly start to do more each day  Rest when you feel it is needed  · Avoid irritants in the air  Avoid chemicals, fumes, and dust  Wear a face mask if you must work around dust or fumes  Stay inside on days when air pollution levels are high  If you have allergies, stay inside when pollen counts are high  Do not use aerosol products, such as spray-on deodorant, bug spray, and hair spray  · Do not smoke or be around others who smoke  Nicotine and other chemicals in cigarettes and cigars damages the cilia that move mucus out of your lungs  Ask your healthcare provider for information if you currently smoke and need help to quit  E-cigarettes or smokeless tobacco still contain nicotine  Talk to your healthcare provider before you use these products  · Drink liquids as directed  Liquids help keep your air passages moist and help you cough up mucus  You may need to drink more liquids when you have acute bronchitis  Ask how much liquid to drink each day and which liquids are best for you  · Use a humidifier or vaporizer  Use a cool mist humidifier or a vaporizer to increase air moisture in your home   This may make it easier for you to breathe and help decrease your cough  Decrease risk for acute bronchitis:   · Get the vaccinations you need  Ask your healthcare provider if you should get vaccinated against the flu or pneumonia  · Prevent the spread of germs  You can decrease your risk of acute bronchitis and other illnesses by doing the following:     [facility] your hands often with soap and water  Carry germ-killing hand lotion or gel with you  You can use the lotion or gel to clean your hands when soap and water are not available  ¨ Do not touch your eyes, nose, or mouth unless you have washed your hands first     ¨ Always cover your mouth when you cough to prevent the spread of germs  It is best to cough into a tissue or your shirt sleeve instead of into your hand  Ask those around you cover their mouths when they cough  ¨ Try to avoid people who have a cold or the flu  If you are sick, stay away from others as much as possible  Medicines: Your healthcare provider may  give you any of the following:  · Ibuprofen or acetaminophen  are medicines that help lower your fever  They are available without a doctor's order  Ask your healthcare provider which medicine is right for you  Ask how much to take and how often to take it  Follow directions  These medicines can cause stomach bleeding if not taken correctly  Ibuprofen can cause kidney damage  Do not take ibuprofen if you have kidney disease, an ulcer, or allergies to aspirin  Acetaminophen can cause liver damage  Do not take more than 4,000 milligrams in 24 hours  · Decongestants  help loosen mucus in your lungs and make it easier to cough up  This can help you breathe easier  · Cough suppressants  decrease your urge to cough  If your cough produces mucus, do not take a cough suppressant unless your healthcare provider tells you to  Your healthcare provider may suggest that you take a cough suppressant at night so you can rest     · Inhalers  may be given   Your healthcare provider may give you one or more inhalers to help you breathe easier and cough less  An inhaler gives your medicine to open your airways  Ask your healthcare provider to show you how to use your inhaler correctly  · Take your medicine as directed  Contact your healthcare provider if you think your medicine is not helping or if you have side effects  Tell him of her if you are allergic to any medicine  Keep a list of the medicines, vitamins, and herbs you take  Include the amounts, and when and why you take them  Bring the list or the pill bottles to follow-up visits  Carry your medicine list with you in case of an emergency  Follow up with your healthcare provider as directed:  Write down questions you have so you will remember to ask them during your follow-up visits  © 2017 [address] [employer] Information is for End User's use only and may not be sold, redistributed or otherwise used for commercial purposes  All illustrations and images included in CareNotes® are the copyrighted property of A D A M [employer] Inc  or [employer]  The above information is an  only  It is not intended as medical advice for individual conditions or treatments  Talk to your doctor, nurse or pharmacist before following any medical regimen to see if it is safe and effective for you  Sinusitis   WHAT YOU NEED TO KNOW:   Sinusitis is inflammation or infection of your sinuses  It is most often caused by a virus  Acute sinusitis may last up to 12 weeks  Chronic sinusitis lasts longer than 12 weeks  Recurrent sinusitis means you have 4 or more times in 1 year  DISCHARGE INSTRUCTIONS:   Return to the emergency department if:   · Your eye and eyelid are red, swollen, and painful  · You cannot open your eye  · You have vision changes, such as double vision  · Your eyeball bulges out or you cannot move your eye  · You are more sleepy than normal, or you notice changes in your ability to think, move, or talk      · You have a stiff neck, a fever, or a bad headache  · You have swelling of your forehead or scalp  Contact your healthcare provider if:   · Your symptoms do not improve after 3 days  · Your symptoms do not go away after 10 days  · You have nausea and are vomiting  · Your nose is bleeding  · You have questions or concerns about your condition or care  Medicines: Your symptoms may go away on their own  Your healthcare provider may recommend watchful waiting for up to 10 days before starting antibiotics  You may  need any of the following:  · Acetaminophen  decreases pain and fever  It is available without a doctor's order  Ask how much to take and how often to take it  Follow directions  Read the labels of all other medicines you are using to see if they also contain acetaminophen, or ask your doctor or pharmacist  Acetaminophen can cause liver damage if not taken correctly  Do not use more than 4 grams (4,000 milligrams) total of acetaminophen in one day  · NSAIDs , such as ibuprofen, help decrease swelling, pain, and fever  This medicine is available with or without a doctor's order  NSAIDs can cause stomach bleeding or kidney problems in certain people  If you take blood thinner medicine, always ask your healthcare provider if NSAIDs are safe for you  Always read the medicine label and follow directions  · Nasal steroid sprays  may help decrease inflammation in your nose and sinuses  · Decongestants  help reduce swelling and drain mucus in the nose and sinuses  They may help you breathe easier  · Antihistamines  help dry mucus in the nose and relieve sneezing  · Antibiotics  help treat or prevent a bacterial infection  · Take your medicine as directed  Contact your healthcare provider if you think your medicine is not helping or if you have side effects  Tell him or her if you are allergic to any medicine  Keep a list of the medicines, vitamins, and herbs you take   Include the amounts, and when and why you take them  Bring the list or the pill bottles to follow-up visits  Carry your medicine list with you in case of an emergency  Self-care:   · Rinse your sinuses  Use a sinus rinse device to rinse your nasal passages with a saline (salt water) solution or distilled water  Do not use tap water  This will help thin the mucus in your nose and rinse away pollen and dirt  It will also help reduce swelling so you can breathe normally  Ask your healthcare provider how often to do this  · Breathe in steam   Heat a bowl of water until you see steam  Lean over the bowl and make a tent over your head with a large towel  Breathe deeply for about 20 minutes  Be careful not to get too close to the steam or burn yourself  Do this 3 times a day  You can also breathe deeply when you take a hot shower  · Sleep with your head elevated  Place an extra pillow under your head before you go to sleep to help your sinuses drain  · Drink liquids as directed  Ask your healthcare provider how much liquid to drink each day and which liquids are best for you  Liquids will thin the mucus in your nose and help it drain  Avoid drinks that contain alcohol or caffeine  · Do not smoke, and avoid secondhand smoke  Nicotine and other chemicals in cigarettes and cigars can make your symptoms worse  Ask your healthcare provider for information if you currently smoke and need help to quit  E-cigarettes or smokeless tobacco still contain nicotine  Talk to your healthcare provider before you use these products  Prevent the spread of germs that cause sinusitis:  Wash your hands often with soap and water  Wash your hands after you use the bathroom, change a child's diaper, or sneeze  Wash your hands before you prepare or eat food  Follow up with your healthcare provider as directed:   You may be referred to an ear, nose, and throat specialist  Write down your questions so you remember to ask them during your visits  © 2017 [facility] [place][note 2] Information is for End User's use only and may not be sold, redistributed or otherwise used for commercial purposes  All illustrations and images included in CareNotes® are the copyrighted property of [employer]  or [employer]  The above information is an  only  It is not intended as medical advice for individual conditions or treatments  Talk to your doctor, nurse or pharmacist before following any medical regimen to see if it is safe and effective for you

## 2020-01-29 ENCOUNTER — OFFICE VISIT (OUTPATIENT)
Dept: URGENT CARE | Age: 45
End: 2020-01-29
Payer: COMMERCIAL

## 2020-01-29 VITALS
HEART RATE: 88 BPM | OXYGEN SATURATION: 98 % | TEMPERATURE: 97.3 F | RESPIRATION RATE: 18 BRPM | SYSTOLIC BLOOD PRESSURE: 122 MMHG | DIASTOLIC BLOOD PRESSURE: 56 MMHG | HEIGHT: 62 IN | WEIGHT: 171 LBS | BODY MASS INDEX: 31.47 KG/M2

## 2020-01-29 DIAGNOSIS — R68.89 FLU-LIKE SYMPTOMS: Primary | ICD-10-CM

## 2020-01-29 PROCEDURE — 99213 OFFICE O/P EST LOW 20 MIN: CPT | Performed by: NURSE PRACTITIONER

## 2020-01-29 RX ORDER — OSELTAMIVIR PHOSPHATE 75 MG/1
75 CAPSULE ORAL EVERY 12 HOURS SCHEDULED
Qty: 10 CAPSULE | Refills: 0 | Status: SHIPPED | OUTPATIENT
Start: 2020-01-29 | End: 2020-02-03

## 2020-01-29 NOTE — PROGRESS NOTES
NAME: Kassandra Hernandez is a 39 y o  female  : 1975    MRN: [id number]    /56 (BP Location: Left arm, Patient Position: Sitting, Cuff Size: Adult)   Pulse 88   Temp (!) 97 3 °F (36 3 °C) (Tympanic)   Resp 18   Ht 5' 2" (1 575 m)   Wt 77 6 kg (171 lb)   SpO2 98%   BMI 31 28 kg/m²     Assessment and Plan   Flu-like symptoms [R68 89]  1  Flu-like symptoms  oseltamivir (TAMIFLU) 75 mg capsule       Nori Boston was seen today for headache  Diagnoses and all orders for this visit:    Flu-like symptoms  -     oseltamivir (TAMIFLU) 75 mg capsule; Take 1 capsule (75 mg total) by mouth every 12 (twelve) hours for 5 days        Patient Instructions   Patient Instructions     Influenza   WHAT YOU NEED TO KNOW:   Influenza (the flu) is an infection caused by the influenza virus  The flu is easily spread when an infected person coughs, sneezes, or has close contact with others  You may be able to spread the flu to others for 1 week or longer after signs or symptoms appear  DISCHARGE INSTRUCTIONS:   Call 911 for any of the following:   · You have trouble breathing, and your lips look purple or blue  · You have a seizure  Return to the emergency department if:   · You are dizzy, or you are urinating less or not at all  · You have a headache with a stiff neck, and you feel tired or confused  · You have new pain or pressure in your chest     · Your symptoms, such as shortness of breath, vomiting, or diarrhea, get worse  · Your symptoms, such as fever and coughing, seem to get better, but then get worse  Contact your healthcare provider if:   · You have new muscle pain or weakness  · You have questions or concerns about your condition or care  Medicines: You may need any of the following:  · Acetaminophen  decreases pain and fever  It is available without a doctor's order  Ask how much to take and how often to take it  Follow directions   Acetaminophen can cause liver damage if not taken correctly  · NSAIDs , such as ibuprofen, help decrease swelling, pain, and fever  This medicine is available with or without a doctor's order  NSAIDs can cause stomach bleeding or kidney problems in certain people  If you take blood thinner medicine, always ask your healthcare provider if NSAIDs are safe for you  Always read the medicine label and follow directions  · Antivirals  help fight a viral infection  · Take your medicine as directed  Contact your healthcare provider if you think your medicine is not helping or if you have side effects  Tell him or her if you are allergic to any medicine  Keep a list of the medicines, vitamins, and herbs you take  Include the amounts, and when and why you take them  Bring the list or the pill bottles to follow-up visits  Carry your medicine list with you in case of an emergency  Rest  as much as you can to help you recover  Drink liquids as directed  to help prevent dehydration  Ask how much liquid to drink each day and which liquids are best for you  Prevent the spread of influenza:   · Wash your hands often  Use soap and water  Wash your hands after you use the bathroom, change a child's diapers, or sneeze  Wash your hands before you prepare or eat food  Use gel hand cleanser when soap and water are not available  Do not touch your eyes, nose, or mouth unless you have washed your hands first            · Cover your mouth when you sneeze or cough  Cough into a tissue or the bend of your arm  · Clean shared items with a germ-killing   Clean table surfaces, doorknobs, and light switches  Do not share towels, silverware, and dishes with people who are sick  Wash bed sheets, towels, silverware, and dishes with soap and water  · Wear a mask  over your mouth and nose if you are sick or are near anyone who is sick  · Stay away from others  if you are sick  · Influenza vaccine  helps prevent influenza (flu)   Everyone older than 6 months should get a yearly influenza vaccine  Get the vaccine as soon as it is available, usually in September or October each year  Follow up with your healthcare provider as directed:  Write down your questions so you remember to ask them during your visits  © 2017 [employer] [employer] Information is for End User's use only and may not be sold, redistributed or otherwise used for commercial purposes  All illustrations and images included in CareNotes® are the copyrighted property of [employer] M [employer]  or [employer]  The above information is an  only  It is not intended as medical advice for individual conditions or treatments  Talk to your doctor, nurse or pharmacist before following any medical regimen to see if it is safe and effective for you  Proceed to ER if symptoms worsen  Chief Complaint     Chief Complaint   Patient presents with    Headache     since yesterday, pt c/o headache, fatigue, congestion, and cough  pt did not get flu shot this year  pt also c/o LUQ pain that started x2 days ago, states she ate bad food and thats when pain started  History of Present Illness     38 yo female here today with upset stomach for two days, had increased fatigue and headache  She has no fever but feels body aches and chills today  She has soreness and tiredness, no CP and SOB  She has no nausea or vomiting noted  She has not had a flu shot this season either  She has a headache intermittently and not present at this time  5 yr old son dx with influenza today as well  Review of Systems   Review of Systems   Constitutional: Positive for activity change, appetite change, fatigue and fever  HENT: Positive for congestion and rhinorrhea  Negative for sore throat  Respiratory: Positive for cough  Musculoskeletal: Positive for arthralgias and myalgias  Neurological: Positive for headaches           Current Medications       Current Outpatient Medications:     albuterol (2 5 mg/3 mL) 0 083 % nebulizer solution, Take 1 vial (2 5 mg total) by nebulization every 6 (six) hours as needed for wheezing, Disp: 40 vial, Rfl: 0    albuterol (PROAIR HFA) 90 mcg/act inhaler, Inhale 2 puffs every 6 (six) hours as needed for wheezing, Disp: 8 5 g, Rfl: 0    albuterol (PROVENTIL HFA,VENTOLIN HFA) 90 mcg/act inhaler, Inhale 2 puffs every 6 (six) hours as needed for wheezing or shortness of breath, Disp: 2 Inhaler, Rfl: 1    benzonatate (TESSALON PERLES) 100 mg capsule, Take 1 capsule (100 mg total) by mouth 3 (three) times a day as needed for cough, Disp: 20 capsule, Rfl: 0    budesonide-formoterol (SYMBICORT) 160-4 5 mcg/act inhaler, Inhale 2 puffs 2 (two) times a day, Disp: 30 6 g, Rfl: 2    ibuprofen (ADVIL) 200 mg tablet, Take by mouth every 6 (six) hours as needed for mild pain, Disp: , Rfl:     loratadine (CLARITIN) 10 mg tablet, Take 1 tablet (10 mg total) by mouth daily as needed for allergies (congestion), Disp: 30 tablet, Rfl: 0    benzonatate (TESSALON) 200 MG capsule, Take 1 capsule (200 mg total) by mouth 3 (three) times a day as needed for cough (Patient not taking: Reported on 1/29/2020), Disp: 30 capsule, Rfl: 0    omeprazole (PRILOSEC) 40 MG capsule, Take 40 mg by mouth daily , Disp: , Rfl:     oseltamivir (TAMIFLU) 75 mg capsule, Take 1 capsule (75 mg total) by mouth every 12 (twelve) hours for 5 days, Disp: 10 capsule, Rfl: 0    predniSONE 10 mg tablet, 5 tabs po qd x 2 days then 4 tabs po qd x 2 days then 3 tabs po qd x 2 days then 2 tabs po qd x 2 days then 1 tab po qd x 2 days (Patient not taking: Reported on 1/29/2020), Disp: 30 tablet, Rfl: 0    Current Allergies     Allergies as of 01/29/2020    (No Known Allergies)              Past Medical History:   Diagnosis Date    Anemia     Asthma     History of transfusion     approx 10 yrs ago    Low back pain     Neck pain     Wears glasses        Past Surgical History:   Procedure Laterality Date    BREAST LUMPECTOMY      COLONOSCOPY      DILATION AND CURETTAGE OF UTERUS      ESOPHAGOGASTRODUODENOSCOPY      EYE SURGERY      HYSTERECTOMY      age 37    OOPHORECTOMY Left     age 37    OVARY SURGERY      SC REDUCTION OF LARGE BREAST Bilateral 9/19/2017    Procedure: REDUCTION MAMMOPLASTY BREAST;  Surgeon: Seng Goins MD;  Location: [facility];  Service: Plastics    REDUCTION MAMMAPLASTY Bilateral 2017       Family History   Problem Relation Age of Onset    Breast cancer Mother 54    Ovarian cancer Sister 45    Breast cancer Maternal Aunt 48    Diabetes Father          Medications have been verified  The following portions of the patient's history were reviewed and updated as appropriate: allergies, current medications, past family history, past medical history, past social history, past surgical history and problem list     Objective   /56 (BP Location: Left arm, Patient Position: Sitting, Cuff Size: Adult)   Pulse 88   Temp (!) 97 3 °F (36 3 °C) (Tympanic)   Resp 18   Ht 5' 2" (1 575 m)   Wt 77 6 kg (171 lb)   SpO2 98%   BMI 31 28 kg/m²      Physical Exam     Physical Exam   Constitutional: She is oriented to person, place, and time  She appears well-developed and well-nourished  She is cooperative  No distress  HENT:   Right Ear: Hearing and tympanic membrane normal    Left Ear: Hearing and tympanic membrane normal    Nose: No mucosal edema  Right sinus exhibits no maxillary sinus tenderness  Left sinus exhibits no maxillary sinus tenderness  Mouth/Throat: Uvula is midline, oropharynx is clear and moist and mucous membranes are normal  No posterior oropharyngeal edema or posterior oropharyngeal erythema  Tonsils are 0 on the right  Tonsils are 0 on the left  No tonsillar exudate  Neck: Normal range of motion  No spinous process tenderness and no muscular tenderness present  Normal range of motion present  Cardiovascular: Normal rate, regular rhythm and normal heart sounds  Pulmonary/Chest: Effort normal and breath sounds normal  No stridor  No respiratory distress  She has no decreased breath sounds  Musculoskeletal:   Body aches and muscle aches present     Lymphadenopathy:     She has no cervical adenopathy  Neurological: She is alert and oriented to person, place, and time  She is not disoriented  Skin: Skin is warm  Capillary refill takes less than 2 seconds         IKE Golden

## 2020-01-29 NOTE — PATIENT INSTRUCTIONS
Influenza   WHAT YOU NEED TO KNOW:   Influenza (the flu) is an infection caused by the influenza virus  The flu is easily spread when an infected person coughs, sneezes, or has close contact with others  You may be able to spread the flu to others for 1 week or longer after signs or symptoms appear  DISCHARGE INSTRUCTIONS:   Call 911 for any of the following:   · You have trouble breathing, and your lips look purple or blue  · You have a seizure  Return to the emergency department if:   · You are dizzy, or you are urinating less or not at all  · You have a headache with a stiff neck, and you feel tired or confused  · You have new pain or pressure in your chest     · Your symptoms, such as shortness of breath, vomiting, or diarrhea, get worse  · Your symptoms, such as fever and coughing, seem to get better, but then get worse  Contact your healthcare provider if:   · You have new muscle pain or weakness  · You have questions or concerns about your condition or care  Medicines: You may need any of the following:  · Acetaminophen  decreases pain and fever  It is available without a doctor's order  Ask how much to take and how often to take it  Follow directions  Acetaminophen can cause liver damage if not taken correctly  · NSAIDs , such as ibuprofen, help decrease swelling, pain, and fever  This medicine is available with or without a doctor's order  NSAIDs can cause stomach bleeding or kidney problems in certain people  If you take blood thinner medicine, always ask your healthcare provider if NSAIDs are safe for you  Always read the medicine label and follow directions  · Antivirals  help fight a viral infection  · Take your medicine as directed  Contact your healthcare provider if you think your medicine is not helping or if you have side effects  Tell him or her if you are allergic to any medicine  Keep a list of the medicines, vitamins, and herbs you take   Include the amounts, and when and why you take them  Bring the list or the pill bottles to follow-up visits  Carry your medicine list with you in case of an emergency  Rest  as much as you can to help you recover  Drink liquids as directed  to help prevent dehydration  Ask how much liquid to drink each day and which liquids are best for you  Prevent the spread of influenza:   · Wash your hands often  Use soap and water  Wash your hands after you use the bathroom, change a child's diapers, or sneeze  Wash your hands before you prepare or eat food  Use gel hand cleanser when soap and water are not available  Do not touch your eyes, nose, or mouth unless you have washed your hands first            · Cover your mouth when you sneeze or cough  Cough into a tissue or the bend of your arm  · Clean shared items with a germ-killing   Clean table surfaces, doorknobs, and light switches  Do not share towels, silverware, and dishes with people who are sick  Wash bed sheets, towels, silverware, and dishes with soap and water  · Wear a mask  over your mouth and nose if you are sick or are near anyone who is sick  · Stay away from others  if you are sick  · Influenza vaccine  helps prevent influenza (flu)  Everyone older than 6 months should get a yearly influenza vaccine  Get the vaccine as soon as it is available, usually in September or October each year  Follow up with your healthcare provider as directed:  Write down your questions so you remember to ask them during your visits  © 2017 [address] [employer] Information is for End User's use only and may not be sold, redistributed or otherwise used for commercial purposes  All illustrations and images included in CareNotes® are the copyrighted property of [employer]  or [employer]  The above information is an  only  It is not intended as medical advice for individual conditions or treatments   Talk to your doctor, nurse or pharmacist before following any medical regimen to see if it is safe and effective for you

## 2020-01-29 NOTE — LETTER
January 29, 2020     Patient: Han Alicea   YOB: 1975   Date of Visit: 1/29/2020       To Whom It May Concern: It is my medical opinion that Han Alicea may return to work on 01/30/2020 if fever free if not please excuse till next working day        Sincerely,        IKE Alfaro    CC: No Recipients

## 2020-01-29 NOTE — LETTER
January 29, 2020     Patient: Lluvia Avelar   YOB: 1975   Date of Visit: 1/29/2020       To Whom it May Concern:    Lluvia Avelar was seen in my clinic on 1/29/2020  She may return to school on 01/30/2020 if fever free if not please excuse till next working day           Sincerely,          IKE Burgess        CC: No Recipients

## 2020-02-06 ENCOUNTER — OFFICE VISIT (OUTPATIENT)
Dept: OBGYN CLINIC | Facility: OTHER | Age: 45
End: 2020-02-06
Payer: COMMERCIAL

## 2020-02-06 VITALS
DIASTOLIC BLOOD PRESSURE: 77 MMHG | HEIGHT: 62 IN | HEART RATE: 82 BPM | WEIGHT: 171 LBS | SYSTOLIC BLOOD PRESSURE: 115 MMHG | BODY MASS INDEX: 31.47 KG/M2

## 2020-02-06 DIAGNOSIS — M75.01 ADHESIVE CAPSULITIS OF RIGHT SHOULDER: Primary | ICD-10-CM

## 2020-02-06 PROCEDURE — 3008F BODY MASS INDEX DOCD: CPT | Performed by: ORTHOPAEDIC SURGERY

## 2020-02-06 PROCEDURE — 20610 DRAIN/INJ JOINT/BURSA W/O US: CPT | Performed by: ORTHOPAEDIC SURGERY

## 2020-02-06 PROCEDURE — 99213 OFFICE O/P EST LOW 20 MIN: CPT | Performed by: ORTHOPAEDIC SURGERY

## 2020-02-06 PROCEDURE — 1036F TOBACCO NON-USER: CPT | Performed by: ORTHOPAEDIC SURGERY

## 2020-02-06 RX ORDER — BUPIVACAINE HYDROCHLORIDE 2.5 MG/ML
2 INJECTION, SOLUTION INFILTRATION; PERINEURAL
Status: COMPLETED | OUTPATIENT
Start: 2020-02-06 | End: 2020-02-06

## 2020-02-06 RX ORDER — BETAMETHASONE SODIUM PHOSPHATE AND BETAMETHASONE ACETATE 3; 3 MG/ML; MG/ML
6 INJECTION, SUSPENSION INTRA-ARTICULAR; INTRALESIONAL; INTRAMUSCULAR; SOFT TISSUE
Status: COMPLETED | OUTPATIENT
Start: 2020-02-06 | End: 2020-02-06

## 2020-02-06 RX ADMIN — BUPIVACAINE HYDROCHLORIDE 2 ML: 2.5 INJECTION, SOLUTION INFILTRATION; PERINEURAL at 09:26

## 2020-02-06 RX ADMIN — BETAMETHASONE SODIUM PHOSPHATE AND BETAMETHASONE ACETATE 6 MG: 3; 3 INJECTION, SUSPENSION INTRA-ARTICULAR; INTRALESIONAL; INTRAMUSCULAR; SOFT TISSUE at 09:26

## 2020-02-06 NOTE — PATIENT INSTRUCTIONS

## 2020-02-06 NOTE — PROGRESS NOTES
Maury Felix MD personally examined the patient and reviewed the history provided  I agree with the note and the assessment and plan by Aashish Maynard PA-C  Assessment  Diagnoses and all orders for this visit:    Adhesive capsulitis of right shoulder    Other orders  -     Large joint arthrocentesis        Discussion and Plan:    Pt elects a 2nd steroid injx today  She can c/w exercises/stretches on her own or call if she would like formal therapy  We can repeat steroid injx if needed in 4 months      Subjective:   Patient ID: Angelic Hendrix is a 39 y o  female      HPI    Pt presents today for follow up of R shoulder adhesive capsulitis  Patient states the last injection, while it hurt at first, kicked in and did provide good relief  She feels like her motion is 75-80% better  States she does still have pain  She did not go to formal physical therapy but has been doing exercises on her own  The following portions of the patient's history were reviewed and updated as appropriate: allergies, current medications, past family history, past medical history, past social history, past surgical history and problem list     Review of Systems   Constitutional: Negative  HENT: Negative  Eyes: Negative  Respiratory: Negative  Cardiovascular: Negative  Gastrointestinal: Negative  Endocrine: Negative  Genitourinary: Negative  Allergic/Immunologic: Negative  Neurological: Negative  Hematological: Negative  Psychiatric/Behavioral: Negative  Objective:  /77   Pulse 82   Ht 5' 2" (1 575 m)   Wt 77 6 kg (171 lb)   BMI 31 28 kg/m²       Right Shoulder Exam     Tenderness   The patient is experiencing tenderness in the biceps tendon (Pt with some mild ttp lateral shoulder)      Range of Motion   Active abduction: 80   Passive abduction: 90   External rotation: 20   Forward flexion: 140   Internal rotation 0 degrees: Lumbar     Muscle Strength   The patient has normal right shoulder strength (with pain)  Other   Erythema: absent  Scars: absent            Physical Exam   Constitutional: She is oriented to person, place, and time  She appears well-developed and well-nourished  HENT:   Head: Normocephalic and atraumatic  Eyes: Conjunctivae and EOM are normal    Neck: No tracheal deviation present  No thyromegaly present  Cardiovascular: Normal rate, regular rhythm and intact distal pulses  Pulmonary/Chest: Effort normal    Abdominal: She exhibits no distension  There is no guarding  Neurological: She is alert and oriented to person, place, and time  Skin: Skin is warm and dry  Psychiatric: She has a normal mood and affect  Her behavior is normal  Judgment and thought content normal        Large joint arthrocentesis: R glenohumeral  Date/Time: 2/6/2020 9:26 AM  Consent given by: patient  Timeout: Immediately prior to procedure a time out was called to verify the correct patient, procedure, equipment, support staff and site/side marked as required   Supporting Documentation  Indications: pain   Procedure Details  Location: shoulder - R glenohumeral  Needle size: 22 G  Approach: posterior  Medications administered: 2 mL bupivacaine 0 25 %; 6 mg betamethasone acetate-betamethasone sodium phosphate 6 (3-3) mg/mL    Patient tolerance: patient tolerated the procedure well with no immediate complications  Dressing:  Sterile dressing applied            I have personally reviewed pertinent films in PACS and my interpretation is as follows    No new images obtained

## 2020-02-25 ENCOUNTER — TELEPHONE (OUTPATIENT)
Dept: ADMINISTRATIVE | Facility: OTHER | Age: 45
End: 2020-02-25

## 2020-02-25 ENCOUNTER — TELEPHONE (OUTPATIENT)
Dept: FAMILY MEDICINE CLINIC | Facility: CLINIC | Age: 45
End: 2020-02-25

## 2020-02-25 NOTE — TELEPHONE ENCOUNTER
----- Message from Masoud Batista sent at 2/24/2020  4:26 PM EST -----  Regarding: Cervical Cancer Screen  Contact: [phone]  02/24/20 4:26 PM    Raffi, our patient Terence Fitch has had Pap Smear (HPV) aka Cervical Cancer Screening completed/performed  Please assist in updating the patient chart by pulling the Care Everywhere (CE) document  The date of service is 05/25/10       Thank you,  Masoud Batista  [facility]

## 2020-02-25 NOTE — TELEPHONE ENCOUNTER
Upon review of the In Basket request we were able to locate, review, and update the patient chart as requested for Pap Smear (HPV) aka Cervical Cancer Screening  5/25/16    Any additional questions or concerns should be emailed to the Practice Liaisons via [email or web address] email, please do not reply via In Basket      Thank you  Yeimy Vasquez

## 2020-03-03 ENCOUNTER — TELEPHONE (OUTPATIENT)
Dept: FAMILY MEDICINE CLINIC | Facility: CLINIC | Age: 45
End: 2020-03-03

## 2020-03-03 NOTE — TELEPHONE ENCOUNTER
Received Provider Screening Form via fax for the patient with "For Primary Care Provider Use" section "X"'d out  Arrows indicate that "Primary Care Provider Signature" area needs to be completed by [facility]  I called patient to set up physical appointment (patient no showed on 02/25/2020) and patient explained that the employer did the top portion even the labs and I asked that she please fax those results over  She stated she argued that since they did everything else that [facility] not need to sign it  However, being there may be other gender specific preventive screenings due that [facility] has to sign it  Patient made aware [facility] is away and will not see it until the week of 03/09/2020  Hopefully we will have all the results by then from the employer  Patient very nice and agreeable that we will call if she needs to be seen  Thank you

## 2020-03-05 ENCOUNTER — OFFICE VISIT (OUTPATIENT)
Dept: OBGYN CLINIC | Facility: OTHER | Age: 45
End: 2020-03-05
Payer: COMMERCIAL

## 2020-03-05 VITALS
HEART RATE: 89 BPM | DIASTOLIC BLOOD PRESSURE: 74 MMHG | HEIGHT: 62 IN | BODY MASS INDEX: 31.28 KG/M2 | SYSTOLIC BLOOD PRESSURE: 110 MMHG | WEIGHT: 170 LBS

## 2020-03-05 DIAGNOSIS — M24.811 INTERNAL DERANGEMENT OF RIGHT SHOULDER: ICD-10-CM

## 2020-03-05 DIAGNOSIS — M75.01 ADHESIVE CAPSULITIS OF RIGHT SHOULDER: Primary | ICD-10-CM

## 2020-03-05 PROCEDURE — 3008F BODY MASS INDEX DOCD: CPT | Performed by: ORTHOPAEDIC SURGERY

## 2020-03-05 PROCEDURE — 99213 OFFICE O/P EST LOW 20 MIN: CPT | Performed by: ORTHOPAEDIC SURGERY

## 2020-03-05 PROCEDURE — 1036F TOBACCO NON-USER: CPT | Performed by: ORTHOPAEDIC SURGERY

## 2020-03-05 PROCEDURE — 3008F BODY MASS INDEX DOCD: CPT | Performed by: PHYSICIAN ASSISTANT

## 2020-03-05 NOTE — PROGRESS NOTES
I personally examined the patient and reviewed the history provided  I agree with the note and the assessment and plan by Dr Jaylan Escobedo  Assessment:    Slow improvement with adhesive capsulitis of her right shoulder    Plan:    Given her persistent symptoms I am concerned there may be some structural pathology which has led to her adhesive capsulitis and continues to cause her pain  Given this I feel an MRI scan is appropriate to evaluate the structure of the shoulder and we will review the results with her when it has been obtained        Assessment  Diagnoses and all orders for this visit:    Adhesive capsulitis of right shoulder  -     Ambulatory referral to Physical Therapy; Future    Possible Internal derangement of right shoulder  -     MRI shoulder right wo contrast; Future        Discussion and Plan:    WBAT RUE  Continue stretching exercises as directed   Tylenol and NSAIDS as needed for pain relief  Will order MRI of the right shoulder to evaluate for rotator cuff injury   Will see patient back to review MRI results       Subjective:   Patient ID: Licha Chris is a 39 y o  female      Patient presents for follow up of her right shoulder, she has been diagnosed with adhesive capsulitis  Patient has refused formal physical therapy and has continued with home exercises  Her ROM has stayed the same since her last visit but unfortunately she continues to have pain to the shoulder  The following portions of the patient's history were reviewed and updated as appropriate: allergies, current medications, past family history, past medical history, past social history, past surgical history and problem list     Review of Systems   Constitutional: Negative for chills  HENT: Negative for hearing loss  Eyes: Negative for discharge  Respiratory: Negative for cough  Cardiovascular: Negative for chest pain  Skin: Negative for pallor  Neurological: Negative for numbness  Psychiatric/Behavioral: Negative for agitation  Objective:  /74   Pulse 89   Ht 5' 2" (1 575 m)   Wt 77 1 kg (170 lb)   BMI 31 09 kg/m²       Right Shoulder Exam     Tenderness   The patient is experiencing no tenderness  Range of Motion   Active abduction: 80   Passive abduction: 100   External rotation: 30   Forward flexion: 140     Muscle Strength   Abduction: 4/5   External rotation: 4/5   Supraspinatus: 5/5   Subscapularis: 5/5   Biceps: 5/5     Tests   Mathis test: negative  Impingement: negative  Drop arm: positive    Other   Erythema: absent  Sensation: normal  Pulse: present            Physical Exam   Constitutional: She is oriented to person, place, and time  She appears well-developed and well-nourished  HENT:   Head: Normocephalic and atraumatic  Eyes: Pupils are equal, round, and reactive to light  EOM are normal    Cardiovascular: Normal rate and intact distal pulses  Pulmonary/Chest: Effort normal  No respiratory distress  Neurological: She is alert and oriented to person, place, and time  Skin: Skin is warm and dry  Psychiatric: She has a normal mood and affect  Her behavior is normal          I have personally reviewed pertinent films in PACS and my interpretation is as follows      No new imaging obtained at this visit

## 2020-03-10 ENCOUNTER — TELEPHONE (OUTPATIENT)
Dept: SURGERY | Facility: CLINIC | Age: 45
End: 2020-03-10

## 2020-03-10 PROBLEM — E66.9 CLASS 1 OBESITY: Status: ACTIVE | Noted: 2020-03-10

## 2020-03-10 PROBLEM — E66.811 CLASS 1 OBESITY: Status: ACTIVE | Noted: 2020-03-10

## 2020-03-13 ENCOUNTER — TELEPHONE (OUTPATIENT)
Dept: FAMILY MEDICINE CLINIC | Facility: CLINIC | Age: 45
End: 2020-03-13

## 2020-03-13 NOTE — TELEPHONE ENCOUNTER
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

## 2020-03-13 NOTE — TELEPHONE ENCOUNTER
Wife, [place] called that patient may have been exposed due to co worker was not symptomatic but quarantined in [place] just in case due to the [place] shutting down parts of the county  Everything hit patient around 7pm last night  It is hard to tell if SOB due to asthma, no cough      Fever ranges  5 last night/this morning, right now steady at 100      She headache, general felling of not feeling well, coughing and sneezing but not consistent

## 2020-03-19 ENCOUNTER — TELEPHONE (OUTPATIENT)
Dept: FAMILY MEDICINE CLINIC | Facility: CLINIC | Age: 45
End: 2020-03-19

## 2020-03-19 NOTE — TELEPHONE ENCOUNTER
Can she get up to the [facility] mobile van for testing?- they finish up at [address] [place] [address] for testing  I will enter order for the mobile Fidencio Vela

## 2020-03-19 NOTE — TELEPHONE ENCOUNTER
Called and spoke with patient  Patient states that she did a video call while waiting for our office to contact back and was sent to go have the testing completed  Patient states that she just left from getting the test completed five minutes ago

## 2020-03-19 NOTE — TELEPHONE ENCOUNTER
[place] got on the phone for Heaven Otoole she is the one with the symptoms not Kalli Mishra has fever 101 8 cough chest tightness   What should pt do

## 2020-03-19 NOTE — TELEPHONE ENCOUNTER
Patient called stating she has an appointment tomorrow morning (for a physical with Monalisa Bazan) and would like to know if we have anything this afternoon  Patient continued that she has a fever, no cough except with asthma, and chest tightness  No out of ordinary SOB, except as with asthma  She has been using asthma medications  The fever has been on and off, as I see in notes earlier this week  I asked if she may have been exposed to COVID-19 and she thinks "Not Really"  Her grandkids flew home on Sunday from [place] but they are just fine  No sickness  I asked Morse Kayser, MA, who stated we will get back to the patient with guidance  Please advise  Thank you

## 2020-03-19 NOTE — TELEPHONE ENCOUNTER
Called pt to move Physical tomorrow pt was all upset because of the form that needs to be fill out for the physical,we just cancelled for now,   [place] got on the phone and said that 2 hrs ago her temp was 99 and they just checked it an it was 101 8 she has a cough, headache and her chest is tight Statement Selected

## 2020-03-19 NOTE — TELEPHONE ENCOUNTER
Patient should reschedule her physical tomorrow  She should continue to monitor her symptoms, monitor her temps and record them  If she progresses with any difficulty breathing, she can call back and we can refer again to urgent

## 2020-03-27 ENCOUNTER — TELEMEDICINE (OUTPATIENT)
Dept: FAMILY MEDICINE CLINIC | Facility: CLINIC | Age: 45
End: 2020-03-27
Payer: COMMERCIAL

## 2020-03-27 ENCOUNTER — APPOINTMENT (OUTPATIENT)
Dept: RADIOLOGY | Age: 45
End: 2020-03-27
Payer: COMMERCIAL

## 2020-03-27 DIAGNOSIS — J45.31 MILD PERSISTENT ASTHMA WITH EXACERBATION: ICD-10-CM

## 2020-03-27 DIAGNOSIS — J06.9 UPPER RESPIRATORY TRACT INFECTION, UNSPECIFIED TYPE: Primary | ICD-10-CM

## 2020-03-27 DIAGNOSIS — Z20.828 EXPOSURE TO SARS-ASSOCIATED CORONAVIRUS: ICD-10-CM

## 2020-03-27 DIAGNOSIS — J06.9 UPPER RESPIRATORY TRACT INFECTION, UNSPECIFIED TYPE: ICD-10-CM

## 2020-03-27 PROCEDURE — 71046 X-RAY EXAM CHEST 2 VIEWS: CPT

## 2020-03-27 PROCEDURE — 99214 OFFICE O/P EST MOD 30 MIN: CPT | Performed by: PHYSICIAN ASSISTANT

## 2020-03-27 RX ORDER — AZITHROMYCIN 250 MG/1
TABLET, FILM COATED ORAL
Qty: 6 TABLET | Refills: 0 | Status: SHIPPED | OUTPATIENT
Start: 2020-03-27 | End: 2020-04-01

## 2020-03-27 RX ORDER — PREDNISONE 20 MG/1
20 TABLET ORAL 2 TIMES DAILY WITH MEALS
Qty: 6 TABLET | Refills: 0 | Status: SHIPPED | OUTPATIENT
Start: 2020-03-27 | End: 2020-03-30

## 2020-03-27 NOTE — PROGRESS NOTES
Virtual Regular Visit    Problem List Items Addressed This Visit     None      Visit Diagnoses     Upper respiratory tract infection, unspecified type    -  Primary    Relevant Medications    predniSONE 20 mg tablet    azithromycin (Zithromax) 250 mg tablet    Other Relevant Orders    XR chest pa & lateral    Mild persistent asthma with exacerbation        Relevant Medications    predniSONE 20 mg tablet    azithromycin (Zithromax) 250 mg tablet    Other Relevant Orders    XR chest pa & lateral    Exposure to SARS-associated coronavirus        Relevant Orders    XR chest pa & lateral               Reason for visit is "not getting any better"    Encounter provider Tanisha Ling PA-C    Provider located at [address] [facility],   [address] [address]      Recent Visits  No visits were found meeting these conditions  Showing recent visits within past 7 days and meeting all other requirements     Today's Visits  Date Type Provider Dept   03/27/20 Telemedicine Tnaisha Ling PA-C [facility] [address] today's visits and meeting all other requirements     Future Appointments  Date Type Provider Dept   03/27/20 Telemedicine Tanisha Ling PA-C [facility] [address] future appointments within next 150 days and meeting all other requirements        After connecting through [employer], the patient was identified by name and date of birth  Shona Lockwood was informed that this is a telemedicine visit and that the visit is being conducted through [employer] and patient was informed that this is not a secure, HIPAA-complaint platform  she agrees to proceed  which may not be secure and therefore, might not be HIPAA-compliant  My office door was closed  No one else was in the room  She acknowledged consent and understanding of privacy and security of the video platform   The patient has agreed to participate and understands they can discontinue the visit at any time  Yohan Sparks is a 39 y o  female with PMH of mild persistent asthma  She started with fevers and URI symptoms about 2 weeks ago  She started feeling better but then over the last 2 days she has continued fevers, cough, and increased chest tightness  She lost her voice  If her she talks, she feels short of breath  Her cough is better  The cough is still dry  Her checked her temp about 1 hour ago, 100 4  She is staying hydrated and her appetite is okay  She had diarrhea for 3 days in the beginning but now not  She has been stable on her symbicort and albuterol inhaler, helped with wheezing sensation  She has been taking mucinex, flu and cold  She has been taking tylenol every 6 hours for fever and aches  She was swabbed for COVID through [facility] on 3/19 but test still pending       Past Medical History:   Diagnosis Date    Anemia     Asthma     History of transfusion     approx 10 yrs ago    Low back pain     Neck pain     Wears glasses        Past Surgical History:   Procedure Laterality Date    BREAST LUMPECTOMY      COLONOSCOPY      DILATION AND CURETTAGE OF UTERUS      ESOPHAGOGASTRODUODENOSCOPY      EYE SURGERY      HYSTERECTOMY      age 37    OOPHORECTOMY Left     age 37    OVARY SURGERY      ME REDUCTION OF LARGE BREAST Bilateral 9/19/2017    Procedure: REDUCTION MAMMOPLASTY BREAST;  Surgeon: Christina Hill MD;  Location: [facility];  Service: Plastics    REDUCTION MAMMAPLASTY Bilateral 2017       Current Outpatient Medications   Medication Sig Dispense Refill    albuterol (2 5 mg/3 mL) 0 083 % nebulizer solution Take 1 vial (2 5 mg total) by nebulization every 6 (six) hours as needed for wheezing 40 vial 0    albuterol (PROAIR HFA) 90 mcg/act inhaler Inhale 2 puffs every 6 (six) hours as needed for wheezing 8 5 g 0    albuterol (PROVENTIL HFA,VENTOLIN HFA) 90 mcg/act inhaler Inhale 2 puffs every 6 (six) hours as needed for wheezing or shortness of breath 2 Inhaler 1    azithromycin (Zithromax) 250 mg tablet Take 2 tablets (500 mg total) by mouth daily for 1 day, THEN 1 tablet (250 mg total) daily for 4 days  6 tablet 0    benzonatate (TESSALON PERLES) 100 mg capsule Take 1 capsule (100 mg total) by mouth 3 (three) times a day as needed for cough 20 capsule 0    benzonatate (TESSALON) 200 MG capsule Take 1 capsule (200 mg total) by mouth 3 (three) times a day as needed for cough 30 capsule 0    budesonide-formoterol (SYMBICORT) 160-4 5 mcg/act inhaler Inhale 2 puffs 2 (two) times a day 30 6 g 2    ibuprofen (ADVIL) 200 mg tablet Take by mouth every 6 (six) hours as needed for mild pain      loratadine (CLARITIN) 10 mg tablet Take 1 tablet (10 mg total) by mouth daily as needed for allergies (congestion) 30 tablet 0    omeprazole (PRILOSEC) 40 MG capsule Take 40 mg by mouth daily       predniSONE 20 mg tablet Take 1 tablet (20 mg total) by mouth 2 (two) times a day with meals for 3 days 6 tablet 0     No current facility-administered medications for this visit  No Known Allergies    Review of Systems   Constitutional: Positive for chills, fatigue and fever  Negative for appetite change  HENT: Positive for sore throat and voice change  Negative for congestion and ear pain  Respiratory: Positive for cough, chest tightness, shortness of breath and wheezing  Cardiovascular: Negative  Gastrointestinal: Negative  Skin: Negative  Neurological: Negative  Psychiatric/Behavioral: The patient is nervous/anxious (anxious)  Physical Exam   Constitutional: She is oriented to person, place, and time  She appears well-developed and well-nourished  No distress  Patient appears tired, fatigued   HENT:   Head: Normocephalic and atraumatic  Nose: Nose normal    Voice very raspy   Eyes: Conjunctivae are normal    Neck: No JVD present  Pulmonary/Chest: Effort normal  No respiratory distress     She was not dyspneic during conversation   Neurological: She is alert and oriented to person, place, and time  No cranial nerve deficit  Skin: Skin is warm and dry  No rash noted  She is not diaphoretic  There is pallor  Psychiatric: She has a normal mood and affect  1  Upper respiratory tract infection, unspecified type  - predniSONE 20 mg tablet; Take 1 tablet (20 mg total) by mouth 2 (two) times a day with meals for 3 days  Dispense: 6 tablet; Refill: 0  - azithromycin (Zithromax) 250 mg tablet; Take 2 tablets (500 mg total) by mouth daily for 1 day, THEN 1 tablet (250 mg total) daily for 4 days  Dispense: 6 tablet; Refill: 0  - XR chest pa & lateral; Future    2  Mild persistent asthma with exacerbation  - predniSONE 20 mg tablet; Take 1 tablet (20 mg total) by mouth 2 (two) times a day with meals for 3 days  Dispense: 6 tablet; Refill: 0  - azithromycin (Zithromax) 250 mg tablet; Take 2 tablets (500 mg total) by mouth daily for 1 day, THEN 1 tablet (250 mg total) daily for 4 days  Dispense: 6 tablet; Refill: 0  - XR chest pa & lateral; Future  - will treat as bronchitis/asthma flare however COVID pending x 8 days so will send for chest xray    3  Exposure to SARS-associated coronavirus  - [facility], she will wear mask and let them know when she arrives  - XR chest pa & lateral; Future    I spent 15 minutes with the patient during this visit

## 2020-04-02 ENCOUNTER — TELEMEDICINE (OUTPATIENT)
Dept: FAMILY MEDICINE CLINIC | Facility: CLINIC | Age: 45
End: 2020-04-02
Payer: COMMERCIAL

## 2020-04-02 DIAGNOSIS — K21.9 GASTROESOPHAGEAL REFLUX DISEASE, ESOPHAGITIS PRESENCE NOT SPECIFIED: Primary | ICD-10-CM

## 2020-04-02 DIAGNOSIS — R06.2 WHEEZING: ICD-10-CM

## 2020-04-02 DIAGNOSIS — J45.30 MILD PERSISTENT ASTHMA WITHOUT COMPLICATION: ICD-10-CM

## 2020-04-02 PROCEDURE — 99213 OFFICE O/P EST LOW 20 MIN: CPT | Performed by: PHYSICIAN ASSISTANT

## 2020-04-02 RX ORDER — ALBUTEROL SULFATE 90 UG/1
2 AEROSOL, METERED RESPIRATORY (INHALATION) EVERY 6 HOURS PRN
Qty: 2 INHALER | Refills: 1 | Status: SHIPPED | OUTPATIENT
Start: 2020-04-02

## 2020-04-02 RX ORDER — OMEPRAZOLE 20 MG/1
20 CAPSULE, DELAYED RELEASE ORAL DAILY
Qty: 90 CAPSULE | Refills: 1 | Status: SHIPPED | OUTPATIENT
Start: 2020-04-02

## 2020-04-02 RX ORDER — PREDNISONE 20 MG/1
TABLET ORAL
Qty: 9 TABLET | Refills: 0 | Status: SHIPPED | OUTPATIENT
Start: 2020-04-02 | End: 2020-04-20

## 2020-04-02 RX ORDER — OMEPRAZOLE 20 MG/1
40 CAPSULE, DELAYED RELEASE ORAL DAILY
Qty: 90 CAPSULE | Refills: 1 | Status: SHIPPED | OUTPATIENT
Start: 2020-04-02 | End: 2020-04-02 | Stop reason: SDUPTHER

## 2020-04-10 ENCOUNTER — PATIENT MESSAGE (OUTPATIENT)
Dept: FAMILY MEDICINE CLINIC | Facility: CLINIC | Age: 45
End: 2020-04-10

## 2020-04-20 ENCOUNTER — TELEMEDICINE (OUTPATIENT)
Dept: FAMILY MEDICINE CLINIC | Facility: CLINIC | Age: 45
End: 2020-04-20
Payer: COMMERCIAL

## 2020-04-20 DIAGNOSIS — J30.2 SEASONAL ALLERGIES: ICD-10-CM

## 2020-04-20 DIAGNOSIS — J45.30 MILD PERSISTENT ASTHMA WITHOUT COMPLICATION: Primary | ICD-10-CM

## 2020-04-20 DIAGNOSIS — N60.12 BILATERAL FIBROCYSTIC BREAST CHANGES: ICD-10-CM

## 2020-04-20 DIAGNOSIS — Z12.39 BREAST CANCER SCREENING: ICD-10-CM

## 2020-04-20 DIAGNOSIS — N60.11 BILATERAL FIBROCYSTIC BREAST CHANGES: ICD-10-CM

## 2020-04-20 PROCEDURE — 99214 OFFICE O/P EST MOD 30 MIN: CPT | Performed by: PHYSICIAN ASSISTANT

## 2020-04-20 RX ORDER — MONTELUKAST SODIUM 10 MG/1
10 TABLET ORAL
Qty: 90 TABLET | Refills: 1 | Status: SHIPPED | OUTPATIENT
Start: 2020-04-20 | End: 2021-04-26

## 2020-04-21 ENCOUNTER — TELEPHONE (OUTPATIENT)
Dept: FAMILY MEDICINE CLINIC | Facility: CLINIC | Age: 45
End: 2020-04-21

## 2021-02-04 ENCOUNTER — OFFICE VISIT (OUTPATIENT)
Dept: URGENT CARE | Age: 46
End: 2021-02-04
Payer: COMMERCIAL

## 2021-02-04 VITALS
HEIGHT: 62 IN | DIASTOLIC BLOOD PRESSURE: 69 MMHG | TEMPERATURE: 98.1 F | WEIGHT: 170 LBS | BODY MASS INDEX: 31.28 KG/M2 | SYSTOLIC BLOOD PRESSURE: 133 MMHG | OXYGEN SATURATION: 100 % | RESPIRATION RATE: 18 BRPM | HEART RATE: 84 BPM

## 2021-02-04 DIAGNOSIS — R07.9 CHEST PAIN, UNSPECIFIED TYPE: Primary | ICD-10-CM

## 2021-02-04 PROCEDURE — G0382 LEV 3 HOSP TYPE B ED VISIT: HCPCS | Performed by: PHYSICIAN ASSISTANT

## 2021-02-04 PROCEDURE — 93005 ELECTROCARDIOGRAM TRACING: CPT | Performed by: PHYSICIAN ASSISTANT

## 2021-02-04 RX ORDER — ASPIRIN 81 MG/1
162 TABLET, CHEWABLE ORAL ONCE
Status: COMPLETED | OUTPATIENT
Start: 2021-02-04 | End: 2021-02-04

## 2021-02-04 RX ADMIN — ASPIRIN 162 MG: 81 TABLET, CHEWABLE ORAL at 17:20

## 2021-02-04 NOTE — PROGRESS NOTES
Assessment/Plan    Chest pain, unspecified type [R07 9]  1  Chest pain, unspecified type  aspirin chewable tablet 162 mg    Transfer to other facility   Discussed with patient current symptoms are concerning for possible cardiac event  Recommend Emergency Room evaluation  Offered EMS transport, but patient declined and has asked a nearby friend to transport her to [facility] ED as she feels they will be able to get there most quickly as [facility] roads are not completely free of snow  Discussed risks if she becomes unstable on route, but pt prefers private transport at this time  Transfer order to [facility] ED placed  Pt was given 2 81 mg aspirin to chew while awaiting her friend's arrival  Pt was monitored by myself and escorted to her friend's vehicle by myself and remained conscious and alert while entering the vehicle and being transported out of the parking lot  Subjective:     Patient ID: Kendrick Mcdowell is a 42 y o  female  Reason For Visit / Chief Complaint  Chief Complaint   Patient presents with    Chest Pain     pt states this morning she felt chest tightness, palpitations like when she drinks a lot of coffee, fatigue and SOB  79-year-old female presents today with a complaint of left-sided chest pain  This is been present since this morning  Patient reports that it is more discomfort but occasionally more painful  She currently rates the  the pain about a 3/10  She reports some episodes of feeling short of breath throughout the day as though something was sitting on her chest  She does have asthma  She also reports feeling very sweaty on and off today  At one point when she removed her coat at work she had a thin layer of sweat on her arms  She states she has been fatigued and not feeling right, but not feeling ill all week and feels she is not processing well today  She denies weakness of her extremities and light-headedness     She denies any dizziness but does report that she has a history of vertigo along with history of asthma  Patient reports that she took some Dramamine on yesterday afternoon, she was feeling vertigo symptoms at the time  She denies previous cardiac history and reports she has not taken any medications today  Pt denies cough, headache, runny nose, congestion, loss of taste and smell, nausea, vomitting, and diarrhea at this time as well as exposure to anyone positive for COVID-19  She is non-smoker and does not drink alcohol and denies h/o of hypertension  Patient reports that her mother had a stroke at age 59 but has no additional family cardiovascular history  She reports that her mother also has a history of hypertension  Past Medical History:   Diagnosis Date    Anemia     Asthma     History of transfusion     approx 10 yrs ago    Low back pain     Neck pain     Wears glasses        Past Surgical History:   Procedure Laterality Date    BREAST LUMPECTOMY      COLONOSCOPY      DILATION AND CURETTAGE OF UTERUS      ESOPHAGOGASTRODUODENOSCOPY      EYE SURGERY      HYSTERECTOMY      age 37    OOPHORECTOMY Left     age 37    OVARY SURGERY      [place] BREAST REDUCTION Bilateral 9/19/2017    Procedure: REDUCTION MAMMOPLASTY BREAST;  Surgeon: Cele Lemon MD;  Location[facility];  Service: Plastics    REDUCTION MAMMAPLASTY Bilateral 2017       Family History   Problem Relation Age of Onset    Breast cancer Mother 54    Ovarian cancer Sister 45    Breast cancer Maternal Aunt 48    Diabetes Father        Review of Systems   Constitutional: Positive for diaphoresis and fatigue  Negative for chills and fever  HENT: Negative for congestion  Respiratory: Positive for shortness of breath  Negative for cough  Stridor: earlier today, not currently  Cardiovascular: Positive for chest pain  Gastrointestinal: Negative for diarrhea and nausea  Skin: Negative for pallor     Neurological: Negative for dizziness, facial asymmetry, light-headedness and numbness  Psychiatric/Behavioral: Positive for confusion  Objective:    /69   Pulse 84   Temp 98 1 °F (36 7 °C)   Resp 18   Ht 5' 2" (1 575 m)   Wt 77 1 kg (170 lb)   SpO2 100%   BMI 31 09 kg/m²     Physical Exam  Constitutional:       General: She is not in acute distress  Appearance: Normal appearance  She is well-developed  She is not ill-appearing or diaphoretic  HENT:      Head: Normocephalic and atraumatic  Right Ear: External ear normal       Left Ear: External ear normal       Nose: No nasal deformity  Eyes:      General: Lids are normal    Cardiovascular:      Rate and Rhythm: Normal rate and regular rhythm  Heart sounds: Normal heart sounds, S1 normal and S2 normal  No murmur  No friction rub  No gallop  Pulmonary:      Effort: Pulmonary effort is normal       Breath sounds: No decreased breath sounds, wheezing, rhonchi or rales  Skin:     General: Skin is cool and dry  Coloration: Skin is not ashen  Neurological:      General: No focal deficit present  Mental Status: She is alert and oriented to person, place, and time  ECG reviewed: ST segment depression noted at start of tracing in lead I and lead II, difficult to determine if artifact or other process, no ST elevation, normal rate and rhythm, possible Left Atrial Enlargement

## 2021-02-04 NOTE — PATIENT INSTRUCTIONS
Recommend transfer to Emergency Room for additional work-up in light of symptoms  Pt offered ambulance, but declines request to have nearby friend take her, discussed risks of private transport, but patient prefers this route

## 2021-02-07 LAB
ATRIAL RATE: 77 BPM
P AXIS: 41 DEGREES
PR INTERVAL: 164 MS
QRS AXIS: 0 DEGREES
QRSD INTERVAL: 82 MS
QT INTERVAL: 408 MS
QTC INTERVAL: 461 MS
T WAVE AXIS: 24 DEGREES
VENTRICULAR RATE: 77 BPM

## 2021-02-07 PROCEDURE — 93010 ELECTROCARDIOGRAM REPORT: CPT | Performed by: INTERNAL MEDICINE

## 2021-03-30 ENCOUNTER — TELEPHONE (OUTPATIENT)
Dept: FAMILY MEDICINE CLINIC | Facility: CLINIC | Age: 46
End: 2021-03-30

## 2021-03-30 DIAGNOSIS — N63.20 LEFT BREAST MASS: ICD-10-CM

## 2021-03-30 DIAGNOSIS — R92.8 ABNORMAL MAMMOGRAM OF BOTH BREASTS: Primary | ICD-10-CM

## 2021-03-30 DIAGNOSIS — R92.1 BREAST CALCIFICATION, RIGHT: ICD-10-CM

## 2021-03-30 NOTE — TELEPHONE ENCOUNTER
Paulie Dyer of [facility] called stating she had the patient on another line, asking for results of mammogram   In research I found that mammogram was ordered by our provider, however, it does not show it was completed yet  There are [facility]  or [facility]  I asked if the patient had it completed and she said yes, at [facility]  I said as soon as we get results, we will be in touch with the patient  Paulie Dyer understood  She stated she will call to the [facility] and see about getting results uploaded  She will advise patient of same

## 2021-03-30 NOTE — TELEPHONE ENCOUNTER
Relayed message to patient  She understood  Additional testing is scheduled for 03/31/2021, 2 pm   Orders were faxed to Teto Frye at [phone]  [address] [place] [address] Drive, phone#:  [phone]

## 2021-03-30 NOTE — TELEPHONE ENCOUNTER
Please let patient know that mammogram requires further imaging - L breast mass and R breast calcifications requiring further diagnostic mammogram and possibly ultrasound  Orders were placed in chart following faxed results from today

## 2021-04-01 ENCOUNTER — TELEPHONE (OUTPATIENT)
Dept: FAMILY MEDICINE CLINIC | Facility: CLINIC | Age: 46
End: 2021-04-01

## 2021-04-01 DIAGNOSIS — R92.8 ABNORMAL MAMMOGRAM OF BOTH BREASTS: ICD-10-CM

## 2021-04-01 DIAGNOSIS — R92.1 BREAST CALCIFICATION, RIGHT: ICD-10-CM

## 2021-04-01 DIAGNOSIS — N63.20 LEFT BREAST MASS: ICD-10-CM

## 2021-04-01 NOTE — TELEPHONE ENCOUNTER
Patient is scheduled for biopsy of left breast on April 13 with [address] with [facility]  Patient has a mass  Script is needed   Fax number is [phone]

## 2021-04-26 ENCOUNTER — CONSULT (OUTPATIENT)
Dept: SURGERY | Facility: CLINIC | Age: 46
End: 2021-04-26
Payer: COMMERCIAL

## 2021-04-26 VITALS
WEIGHT: 170 LBS | SYSTOLIC BLOOD PRESSURE: 132 MMHG | DIASTOLIC BLOOD PRESSURE: 72 MMHG | BODY MASS INDEX: 31.28 KG/M2 | TEMPERATURE: 97.9 F | HEART RATE: 72 BPM | HEIGHT: 62 IN

## 2021-04-26 DIAGNOSIS — J30.2 SEASONAL ALLERGIES: ICD-10-CM

## 2021-04-26 DIAGNOSIS — N60.11 BILATERAL FIBROCYSTIC BREAST CHANGES: Primary | ICD-10-CM

## 2021-04-26 DIAGNOSIS — N60.12 BILATERAL FIBROCYSTIC BREAST CHANGES: Primary | ICD-10-CM

## 2021-04-26 DIAGNOSIS — Z12.39 BREAST CANCER SCREENING, HIGH RISK PATIENT: Primary | ICD-10-CM

## 2021-04-26 DIAGNOSIS — J45.30 MILD PERSISTENT ASTHMA WITHOUT COMPLICATION: ICD-10-CM

## 2021-04-26 PROCEDURE — 3008F BODY MASS INDEX DOCD: CPT | Performed by: SURGERY

## 2021-04-26 PROCEDURE — 1036F TOBACCO NON-USER: CPT | Performed by: SURGERY

## 2021-04-26 PROCEDURE — 99213 OFFICE O/P EST LOW 20 MIN: CPT | Performed by: SURGERY

## 2021-04-26 RX ORDER — MONTELUKAST SODIUM 10 MG/1
TABLET ORAL
Qty: 90 TABLET | Refills: 0 | Status: SHIPPED | OUTPATIENT
Start: 2021-04-26

## 2021-04-26 RX ORDER — LINACLOTIDE 145 UG/1
CAPSULE, GELATIN COATED ORAL
COMMUNITY
Start: 2021-03-25 | End: 2022-02-27

## 2021-04-26 NOTE — PROGRESS NOTES
Assessment/Plan:   Davis Dow is a 42 y  o female who is here for a breast concern  High risk screening due to family history      patient had a mammogram and ultrasound  On 03/21/2021 in which multiple findings were seen   Right left outer breast with loosely calcified tissue related to prior breast resection    Targeted ultrasound in areas of mammographic concern of left breast   Multiple cystic changes compatible with cyst   The final finding at 10:00  position 3-4 cm from nipple suggest and irregular mass which recommended core needle biopsy     core needle biopsy showed a fibroadenoma  Recommend six-month follow-up    Plan: six-month follow-up of bilateral mammogram and MRI due to high risk screening  Family history  Mother and aunt had breast cancer in early 46s  Genetic testing was reported negative  Nicoleer-Diyazick Risk of 19 %      _______________________________________________________  HPI:  Davis Dow is a 42 y  o female who was referred for evaluation of Follow-up (mammo)        Currently: breast pain  With abdnormal mammogram with strong family history of breast cancer  Duration symptoms:     Symptoms:   positive for - new or changing breast lumps      Most recent mammogram:  March 21, 2021 BI-RADS 4    Number of children: G 1    Age at first child or pregnancy >3 months: 19    Menopause: hysterectomy    Hormone Replacement Therapy:   no    Previous breast biopsy:  April 2, 2021 consistent with a fibroadenoma    Family history: Maternal Aunt and mother     sister with family history of ovarian cancer    ROS:  General ROS: negative  negative for - chills, fatigue, fever or night sweats, weight loss  Respiratory ROS: no cough, shortness of breath, or wheezing  Cardiovascular ROS: no chest pain or dyspnea on exertion  Genito-Urinary ROS: no dysuria, trouble voiding, or hematuria  Musculoskeletal ROS: negative for - gait disturbance, joint pain or muscle pain  Neurological ROS: no TIA or stroke symptoms  Breast ROS: see HPI  GI ROS: see HPI  Skin ROS: no new rashes or lesions   Lymphatic ROS: no new adenopathy noted by pt  GYN ROS: see HPI, no new GYN history or bleeding noted  Psy ROS: no new mental or behavioral disturbances               Patient Active Problem List   Diagnosis    Anxiety    Bilateral fibrocystic breast changes    Extrinsic asthma without status asthmaticus    Fibroadenosis of breast    Esophageal reflux    IBS (irritable bowel syndrome)    Macromastia    Adhesive capsulitis of right shoulder    Class 1 obesity         Allergies: Patient has no known allergies      Meds:   Current Outpatient Medications:     albuterol (2 5 mg/3 mL) 0 083 % nebulizer solution, Take 1 vial (2 5 mg total) by nebulization every 6 (six) hours as needed for wheezing, Disp: 40 vial, Rfl: 0    albuterol (PROVENTIL HFA,VENTOLIN HFA) 90 mcg/act inhaler, Inhale 2 puffs every 6 (six) hours as needed for wheezing or shortness of breath, Disp: 2 Inhaler, Rfl: 1    budesonide-formoterol (SYMBICORT) 160-4 5 mcg/act inhaler, Inhale 2 puffs 2 (two) times a day, Disp: 30 6 g, Rfl: 2    ibuprofen (ADVIL) 200 mg tablet, Take by mouth every 6 (six) hours as needed for mild pain, Disp: , Rfl:     Linzess 145 MCG CAPS, , Disp: , Rfl:     montelukast (SINGULAIR) 10 mg tablet, Take 1 tablet (10 mg total) by mouth daily at bedtime, Disp: 90 tablet, Rfl: 1    omeprazole (PriLOSEC) 20 mg delayed release capsule, Take 1 capsule (20 mg total) by mouth daily, Disp: 90 capsule, Rfl: 1    PMH:   Past Medical History:   Diagnosis Date    Anemia     Asthma     History of transfusion     approx 10 yrs ago    Low back pain     Neck pain     Wears glasses        PSHx:   Past Surgical History:   Procedure Laterality Date    BREAST LUMPECTOMY      COLONOSCOPY      DILATION AND CURETTAGE OF UTERUS      ESOPHAGOGASTRODUODENOSCOPY      EYE SURGERY      HYSTERECTOMY      age 37    OOPHORECTOMY Left     age 37  OVARY SURGERY      SD BREAST REDUCTION Bilateral 9/19/2017    Procedure: REDUCTION MAMMOPLASTY BREAST;  Surgeon: Claudia Rodríguez MD;  Location: [facility];  Service: Plastics    REDUCTION MAMMAPLASTY Bilateral 2017       Family History:   Family History   Problem Relation Age of Onset    Breast cancer Mother 54    Ovarian cancer Sister 45    Breast cancer Maternal Aunt 48    Diabetes Father        Social History:  reports that she has never smoked  She has never used smokeless tobacco  She reports current alcohol use  She reports that she does not use drugs  Imaging:  Reviewed with patient,  Mammogram and ultrasound findings      Lab Results   Component Value Date    WBC 5 31 08/08/2019    HGB 11 4 (L) 08/08/2019    HCT 36 0 08/08/2019    MCV 87 08/08/2019     08/08/2019     Lab Results   Component Value Date    K 3 8 08/08/2019     08/08/2019    CO2 24 08/08/2019    BUN 15 08/08/2019    CREATININE 0 74 08/08/2019    GLUF 99 08/08/2019    CALCIUM 9 3 08/08/2019    AST 13 08/08/2019    ALT 21 08/08/2019    ALKPHOS 64 08/08/2019    EGFR 100 08/08/2019       Vitals:    04/26/21 1414   BP: 132/72   Pulse: 72   Temp: 97 9 °F (36 6 °C)          PHYSICAL EXAM  General Appearance:    Alert, cooperative, no distress, healthy   Head:    Normocephalic without obvious abnormality   Eyes:    PERRL, conjunctiva/corneas clear      Neck:   Supple, no adenopathy, no JVD   Back:     Symmetric, no spinal or CVA tenderness   Lungs:     Clear to auscultation bilaterally,   Heart:  Breast:    Regular rate and rhythm, S1 and S2 normal, no murmur    Inspection negative, Normal to palpation without dominant masses, symmetric fibrous changes in both upper outer quadrants, surgical scars noted b/l from breast reduction   Abdomen:     Soft, nontender   Extremities:   Extremities normal  No clubbing, cyanosis or edema   Psych:   Normal Affect, AOx3  Neurologic:  Skin:   CNII-XII intact   Strength symmetric, speech intact    Warm, dry, intact, no visible rashes or lesions                       Signature:     Aimee Medina MD    Date: 4/26/2021 Time: 2:31 PM                 Some portions of this record may have been generated with voice recognition software  There may be translation, syntax,  or grammatical errors  Occasional wrong word or "sound-a-like" substitutions may have occurred due to the inherent limitations of the voice recognition software  Read the chart carefully and recognize, using context, where substitutions may have occurred  If you have any questions, please contact the dictating provider for clarification or correction, as needed  This encounter has been coded by a physician, non certified coder

## 2021-04-27 NOTE — TELEPHONE ENCOUNTER
Patient has not been seen in our office for over a year  Will authorize 1 refill   Needs in office appt

## 2021-06-28 ENCOUNTER — OFFICE VISIT (OUTPATIENT)
Dept: SURGERY | Facility: CLINIC | Age: 46
End: 2021-06-28
Payer: COMMERCIAL

## 2021-06-28 VITALS
DIASTOLIC BLOOD PRESSURE: 70 MMHG | WEIGHT: 172 LBS | SYSTOLIC BLOOD PRESSURE: 130 MMHG | BODY MASS INDEX: 31.65 KG/M2 | HEIGHT: 62 IN | HEART RATE: 84 BPM | TEMPERATURE: 98 F

## 2021-06-28 DIAGNOSIS — Z13.79 GENETIC TESTING: Primary | ICD-10-CM

## 2021-06-28 PROCEDURE — 1036F TOBACCO NON-USER: CPT | Performed by: PHYSICIAN ASSISTANT

## 2021-06-28 PROCEDURE — 36415 COLL VENOUS BLD VENIPUNCTURE: CPT | Performed by: PHYSICIAN ASSISTANT

## 2021-06-28 PROCEDURE — 99213 OFFICE O/P EST LOW 20 MIN: CPT | Performed by: PHYSICIAN ASSISTANT

## 2021-06-28 PROCEDURE — 99000 SPECIMEN HANDLING OFFICE-LAB: CPT | Performed by: PHYSICIAN ASSISTANT

## 2021-06-28 PROCEDURE — 3008F BODY MASS INDEX DOCD: CPT | Performed by: PHYSICIAN ASSISTANT

## 2021-06-28 NOTE — PROGRESS NOTES
Assessment/Plan:   Frank Jackson is a 42 y  o female who is here for The encounter diagnosis was Genetic testing      high risk screening due to family history     family have breast cancer in maternal aunt, mother and sister  Sister with history of ovarian cancer    Plan:  Genetic screening test today  Patient has six-month follow-up of bilateral  Mammogram and an MRI due to high risk  Tyer-Cuzick  Risk of 19%    ______________________________________________________  CC: Follow-up (genetic testing)    HPI:  Frank Jackson is a 42 y  o female who was referred for evaluation of Follow-up (genetic testing)    Currently  No new complaints or findings  Strong family history of breast cancer and ovarian cancer  Patient is here for genetic testing       ROS:  General ROS: negative  negative for - chills, fatigue, fever or night sweats, weight loss  Respiratory ROS: no cough, shortness of breath, or wheezing  Cardiovascular ROS: no chest pain or dyspnea on exertion  Genito-Urinary ROS: no dysuria, trouble voiding, or hematuria  Musculoskeletal ROS: negative for - gait disturbance, joint pain or muscle pain  Neurological ROS: no TIA or stroke symptoms  Gastrointestinal: see HPI  No abdominal pain, melena, BRB unless specified in HPI  Skin ROS: no new rashes or lesions   Lymphatic ROS: no new adenopathy noted by pt  GYN ROS: see HPI, no new GYN history or bleeding noted  Psy ROS: no new mental or behavioral disturbances       Patient Active Problem List   Diagnosis    Anxiety    Bilateral fibrocystic breast changes    Extrinsic asthma without status asthmaticus    Fibroadenosis of breast    Esophageal reflux    IBS (irritable bowel syndrome)    Macromastia    Adhesive capsulitis of right shoulder    Class 1 obesity         Allergies:  Patient has no known allergies        Current Outpatient Medications:     albuterol (2 5 mg/3 mL) 0 083 % nebulizer solution, Take 1 vial (2 5 mg total) by nebulization every 6 (six) hours as needed for wheezing, Disp: 40 vial, Rfl: 0    albuterol (PROVENTIL HFA,VENTOLIN HFA) 90 mcg/act inhaler, Inhale 2 puffs every 6 (six) hours as needed for wheezing or shortness of breath, Disp: 2 Inhaler, Rfl: 1    budesonide-formoterol (SYMBICORT) 160-4 5 mcg/act inhaler, Inhale 2 puffs 2 (two) times a day, Disp: 30 6 g, Rfl: 2    ibuprofen (ADVIL) 200 mg tablet, Take by mouth every 6 (six) hours as needed for mild pain, Disp: , Rfl:     Linzess 145 MCG CAPS, , Disp: , Rfl:     montelukast (SINGULAIR) 10 mg tablet, take 1 tablet by mouth daily at bedtime, Disp: 90 tablet, Rfl: 0    omeprazole (PriLOSEC) 20 mg delayed release capsule, Take 1 capsule (20 mg total) by mouth daily, Disp: 90 capsule, Rfl: 1    Past Medical History:   Diagnosis Date    Anemia     Asthma     History of transfusion     approx 10 yrs ago    Low back pain     Neck pain     Wears glasses        Past Surgical History:   Procedure Laterality Date    BREAST LUMPECTOMY      COLONOSCOPY      DILATION AND CURETTAGE OF UTERUS      ESOPHAGOGASTRODUODENOSCOPY      EYE SURGERY      HYSTERECTOMY      age 37    OOPHORECTOMY Left     age 37    OVARY SURGERY      UT BREAST REDUCTION Bilateral 9/19/2017    Procedure: REDUCTION MAMMOPLASTY BREAST;  Surgeon: Venkat Graf MD;  Location[facility];  Service: Plastics    REDUCTION MAMMAPLASTY Bilateral 2017       Family History   Problem Relation Age of Onset    Breast cancer Mother 54    Ovarian cancer Sister 45    Breast cancer Maternal Aunt 48    Diabetes Father         reports that she has never smoked  She has never used smokeless tobacco  She reports current alcohol use  She reports that she does not use drugs      Labs:   Lab Results   Component Value Date    WBC 5 31 08/08/2019    HGB 11 4 (L) 08/08/2019    HCT 36 0 08/08/2019    MCV 87 08/08/2019     08/08/2019     Lab Results   Component Value Date    K 3 8 08/08/2019    CL 108 08/08/2019    CO2 24 08/08/2019    BUN 15 08/08/2019    CREATININE 0 74 08/08/2019    GLUF 99 08/08/2019    CALCIUM 9 3 08/08/2019    AST 13 08/08/2019    ALT 21 08/08/2019    ALKPHOS 64 08/08/2019    EGFR 100 08/08/2019         Imaging: No new pertinent imaging studies  PHYSICAL EXAM    Vitals:    06/28/21 1318   BP: 130/70   Pulse: 84   Temp: 98 °F (36 7 °C)          PHYSICAL EXAM  General Appearance:    Alert, cooperative, no distress,    Head:    Normocephalic without obvious abnormality   Eyes:    PERRL, conjunctiva/corneas clear, EOM's intact        Neck:   Supple, no adenopathy, no JVD   Back:     Symmetric, no spinal or CVA tenderness   Lungs:     Clear to auscultation bilaterally, no wheezing or rhonchi   Heart:    Regular rate and rhythm, S1 and S2 normal, no murmur   Abdomen:     Soft,NTND, +BS   Extremities:   Extremities normal  No clubbing, cyanosis or edema   Psych:   Normal Affect   Neurologic:   CNII-XII intact  Strength symmetric, speech intact                                           Some portions of this record may have been generated with voice recognition software  There may be translation, syntax,  or grammatical errors  Occasional wrong word or "sound-a-like" substitutions may have occurred due to the inherent limitations of the voice recognition software  Read the chart carefully and recognize, using context, where substitutions may have occurred  If you have any questions, please contact the dictating provider for clarification or correction, as needed  This encounter has been coded by a non-certified coder         Carla Navarro MD    Date: 6/28/2021 Time: 1:23 PM

## 2021-08-12 ENCOUNTER — TELEPHONE (OUTPATIENT)
Dept: FAMILY MEDICINE CLINIC | Facility: CLINIC | Age: 46
End: 2021-08-12

## 2021-08-12 NOTE — TELEPHONE ENCOUNTER
[facility]  (not on patient's pharmacy list) faxed request for Montelukast Tab and Omeprazole caps    No dosages or directions on faxed request  Patient resting comfortably in bed. No concerns voiced at this time. Call light and personal items within reach.

## 2021-10-11 DIAGNOSIS — N60.12 BILATERAL FIBROCYSTIC BREAST CHANGES: Primary | ICD-10-CM

## 2021-10-11 DIAGNOSIS — N60.11 BILATERAL FIBROCYSTIC BREAST CHANGES: Primary | ICD-10-CM

## 2022-01-10 DIAGNOSIS — N60.12 BILATERAL FIBROCYSTIC BREAST CHANGES: Primary | ICD-10-CM

## 2022-01-10 DIAGNOSIS — N60.11 BILATERAL FIBROCYSTIC BREAST CHANGES: Primary | ICD-10-CM

## 2022-01-14 ENCOUNTER — NURSE TRIAGE (OUTPATIENT)
Dept: OTHER | Facility: OTHER | Age: 47
End: 2022-01-14

## 2022-01-14 DIAGNOSIS — Z20.822 ENCOUNTER FOR SCREENING LABORATORY TESTING FOR COVID-19 VIRUS: Primary | ICD-10-CM

## 2022-01-14 PROCEDURE — U0003 INFECTIOUS AGENT DETECTION BY NUCLEIC ACID (DNA OR RNA); SEVERE ACUTE RESPIRATORY SYNDROME CORONAVIRUS 2 (SARS-COV-2) (CORONAVIRUS DISEASE [COVID-19]), AMPLIFIED PROBE TECHNIQUE, MAKING USE OF HIGH THROUGHPUT TECHNOLOGIES AS DESCRIBED BY CMS-2020-01-R: HCPCS | Performed by: FAMILY MEDICINE

## 2022-01-14 PROCEDURE — U0005 INFEC AGEN DETEC AMPLI PROBE: HCPCS | Performed by: FAMILY MEDICINE

## 2022-01-14 NOTE — TELEPHONE ENCOUNTER
Regarding: Covid-Stuffy Nose, Sore Throat, Headache,   ----- Message from Demond Mcneal sent at 1/14/2022 10:18 AM EST -----  " I need to get tested due to Symptoms of Stuffy Nose, Sore Throat, Headache, I Don't feel well "  ( Patient does not see Dr Shyam Mandujano anymore as she moved to [facility]  )

## 2022-01-14 NOTE — TELEPHONE ENCOUNTER
Patient is requesting a covid test and does not have a [facility] PCP  Order placed  Patient informed of [address] [facility] testing site and was advised of hours of operation, address, to wear a mask, and to stay in the car  Patient verbalized understanding  Patient already has a My Chart account to check for results  Advised patient to begin checking in 2-3 days after testing is completed  Reason for Disposition   [1] COVID-19 infection suspected by caller or triager AND [2] mild symptoms (cough, fever, or others) AND [3] has not gotten tested yet    Answer Assessment - Initial Assessment Questions  Were you within 6 feet or less, for up to 15 minutes or more with a person that has a confirmed COVID-19 test? Denies    What was the date of your exposure? Unknown    Are you experiencing any symptoms attributed to the virus?  (Assess for SOB, cough, fever, difficulty breathing) Stuffy/runny nose, sore throat, headache, fatigue, cough, body aches    HIGH RISK: Do you have any history heart or lung conditions, weakened immune system, diabetes, Asthma, CHF, HIV, COPD, Chemo, renal failure, sickle cell, etc? Asthma with inhaler PRN; wheezing this AM; denies shortness of breath    PREGNANCY: Are you pregnant or did you recently give birth?  Denies    VACCINE: "Have you gotten the COVID-19 vaccine?" If Yes ask: "Which one, how many shots, when did you get it?" Yes, Moderna x 2 shots, willg et booster    Protocols used: CORONAVIRUS (COVID-19) DIAGNOSED OR SUSPECTED-ADULT-[place]

## 2022-02-27 ENCOUNTER — AMB VIDEO VISIT (OUTPATIENT)
Dept: OTHER | Facility: HOSPITAL | Age: 47
End: 2022-02-27

## 2022-02-27 DIAGNOSIS — S39.012A LUMBAR STRAIN, INITIAL ENCOUNTER: Primary | ICD-10-CM

## 2022-02-27 PROCEDURE — ECARE PR SL URGENT CARE VIRTUAL VISIT: Performed by: NURSE PRACTITIONER

## 2022-02-27 RX ORDER — METHYLPREDNISOLONE 4 MG/1
TABLET ORAL
Qty: 21 TABLET | Refills: 0 | Status: SHIPPED | OUTPATIENT
Start: 2022-02-27

## 2022-02-27 RX ORDER — CYCLOBENZAPRINE HCL 10 MG
10 TABLET ORAL 3 TIMES DAILY PRN
Qty: 15 TABLET | Refills: 0 | Status: SHIPPED | OUTPATIENT
Start: 2022-02-27 | End: 2022-03-04

## 2022-02-27 NOTE — CARE ANYWHERE EVISITS
Visit Summary for CARYL JACKSON - Gender: Female - Date of Birth: [id number]  Date: [id number] - Duration: 8 minutes  Patient: Fahad JACKSON  Provider: Dariela RAMIRES    Patient Contact Information  Address  [address]; [address]  [phone]    Visit Topics  my lower back is hurting so so bad  normally I can do some stretches and help but I have to days of so much pain  [Added By: Self - [id number]]  Stress / Anxiety [Added By: Self - [id number]]    Triage Questions   What is your current physical address in the event of a medical emergency? Answer []  Are you allergic to any medications? Answer []  Are you now or could you be pregnant? Answer []  Do you have any immune system compromise or chronic lung   disease? Answer []  Do you have any vulnerable family members in the home (infant, pregnant, cancer, elderly)? Answer []     Conversation Transcripts  [0A][0A] [Notification] You are connected with Tammi Cristina, Urgent Care Specialist [0A][Notification] Chikis Schofield is located in [place]  [0A][Notification] Chikis Schofield has shared health history  Julieth Pardo  [0A]    Diagnosis  Strain of muscle, fascia and tendon of lower back    Procedures  Value: [id number] Code: CPT-4 UNLISTED E&M SERVICE    Medications Prescribed    No prescriptions ordered    Electronically signed by: Tammi Martinez(NPI [id number])

## 2022-02-27 NOTE — PATIENT INSTRUCTIONS
Rest   Alternate ice and warm compresses as discussed  Start muscle relaxer for muscle spasms, avoid driving while taking medication  Start steroid taper  Start Tylenol  Follow up with PCP or Ortho for further evaluation  Go to ER with worsening symptoms  Acute Low Back Pain, Ambulatory Care   GENERAL INFORMATION:   Acute low back pain  is discomfort in your lower back area that lasts for less than 12 weeks  The word acute is used to describe pain that starts suddenly, worsens quickly, and lasts for a short time  Common symptoms include the following:   · Back stiffness or spasms    · Pain down the back or side of one leg    · Holding yourself in an unusual position or posture to decrease your back pain    · Not being able to find a sitting position that is comfortable    · Slow increase in your pain for 24 to 48 hours after you stress your back    · Tenderness on your lower back or severe pain when you move your back  Seek immediate care for the following symptoms:   · Severe pain    · Sudden stiffness and heaviness in both buttocks down to both legs    · Numbness or weakness in one leg, or pain in both legs    · Numbness in your genital area or across your lower back    · Unable to control your urine or bowel movements  Treatment for acute low back pain  may include any of the following:  · Medicines:      ¨ NSAIDs  help decrease swelling and pain or fever  This medicine is available with or without a doctor's order  NSAIDs can cause stomach bleeding or kidney problems in certain people  If you take blood thinner medicine, always ask your healthcare provider if NSAIDs are safe for you  Always read the medicine label and follow directions  ¨ Muscle relaxers  help decrease muscle spasms pain  ¨ Prescription pain medicine  may be given  Ask how to take this medicine safely  · Surgery  may be needed if your pain is severe and other treatments do not work   Surgery may be needed for conditions of the lumbar spine, such as herniated disc or spinal stenosis  Manage your symptoms:   · Sleep on a firm mattress  If you do not have a firm mattress, have someone move your mattress to the floor for a few days  A piece of plywood under your mattress can also help make it firmer  · Apply ice  on your lower back for 15 to 20 minutes every hour or as directed  Use an ice pack, or put crushed ice in a plastic bag  Cover it with a towel  Ice helps prevent tissue damage and decreases swelling and pain  You can alternate ice and heat  · Apply heat  on your lower back for 20 to 30 minutes every 2 hours for as many days as directed  Heat helps decrease pain and muscle spasms  · Go to physical therapy  A physical therapist teaches you exercises to help improve movement and strength, and to decrease pain  Prevent acute low back pain:   · Use proper body mechanics  ¨ Bend at the hips and knees when you  objects  Do not bend from the waist  Use your leg muscles as you lift the load  Do not use your back  Keep the object close to your chest as you lift it  Try not to twist or lift anything above your waist     ¨ Change your position often when you stand for long periods of time  Rest one foot on a small box or footrest, and then switch to the other foot often  ¨ Try not to sit for long periods of time  When you do, sit in a straight-backed chair with your feet flat on the floor  Never reach, pull, or push while you are sitting  · Exercise regularly  Warm up before you exercise  Do exercises that strengthen your back muscles  Ask about the best exercise plan for you  · Maintain a healthy weight  Ask your healthcare provider how much you should weigh  Ask him to help you create a weight loss plan if you are overweight  Follow up with your healthcare provider as directed:  Return for a follow-up visit if you still have pain after 1 to 3 weeks of treatment   You may need to visit an orthopedist if your back pain lasts more than 6 to 12 weeks  Write down your questions so you remember to ask them during your visits  CARE AGREEMENT:   You have the right to help plan your care  Learn about your health condition and how it may be treated  Discuss treatment options with your caregivers to decide what care you want to receive  You always have the right to refuse treatment  The above information is an  only  It is not intended as medical advice for individual conditions or treatments  Talk to your doctor, nurse or pharmacist before following any medical regimen to see if it is safe and effective for you  © 2014 [address] is for End User's use only and may not be sold, redistributed or otherwise used for commercial purposes  All illustrations and images included in CareNotes® are the copyrighted property of A D [employer] M [employer]  or [employer]

## 2022-02-27 NOTE — PROGRESS NOTES
Video Visit - Kiran Rajput 41 y o  female MRN: [id number]    REQUIRED DOCUMENTATION:         1  [facility]  2  Provider located at [address] [place] [id number]  3  [facility] provider: IKE Edgar  4  Identify all parties in room with patient during [facility] visit:  Patient   5  After connecting through [employer], patient was identified by name and date of birth  Patient was then informed that this was a Telemedicine visit and that the exam was being conducted confidentially over secure lines  My office door was closed  No one else was in the room  Patient acknowledged consent and understanding of privacy and security of the Telemedicine visit  I informed the patient that I have reviewed their record in Epic and presented the opportunity for them to ask any questions regarding the visit today  The patient agreed to participate  This is a 47-year-old female here today for video visit  She has a history of low back pain  She states she has had MRIs in the past   She states that contribute to muscular  She suspects she may be having some sciatic nerve pain  She states she is having some pain that radiates into her leg  She has been having pain over the last 2 days  She denies more happened she has stress and symptoms improved  She has done a full course of physical therapy in the past   She denies any loss of bowel or bladder  She denies any numbness or tingling except for some intermittent tingling numbness in her toes  She states this is not consistent  This is not new  She is now having some pain that radiates into her right leg  She denies any weakness  She has use Flexeril the past which has helped  She denies any urinary symptoms including urgency, frequency or burning  She denies any chance of pregnancy  She denies history of kidney stone  Pain is worse with movement    She denies any recent injury or trauma to her back  Review of Systems   Constitutional: Negative for activity change, chills, fatigue and fever  Respiratory: Negative  Cardiovascular: Negative  Musculoskeletal: Positive for back pain  Negative for gait problem  Skin: Negative  Neurological: Positive for numbness  Negative for dizziness and weakness  Physical Exam  Constitutional:       General: She is not in acute distress  Appearance: Normal appearance  She is not ill-appearing or toxic-appearing  HENT:      Head: Normocephalic and atraumatic  Pulmonary:      Effort: Pulmonary effort is normal       Comments: No cough during video visit  Musculoskeletal:      Comments: On self palpation pain is not localized to the spine  There is pain over the right paralumbar region  Range of motion causes pain in all directions  She denies any weakness in her legs  Neurological:      General: No focal deficit present  Mental Status: She is alert  She is disoriented  Cranial Nerves: No cranial nerve deficit  Motor: No weakness  Psychiatric:         Mood and Affect: Mood normal          Behavior: Behavior normal          Thought Content: Thought content normal          Judgment: Judgment normal        Diagnoses and all orders for this visit:    Lumbar strain, initial encounter  -     methylprednisolone (MEDROL) 4 mg tablet; Medrol dose pack Take as directed  -     cyclobenzaprine (FLEXERIL) 10 mg tablet; Take 1 tablet (10 mg total) by mouth 3 (three) times a day as needed for muscle spasms for up to 5 days      Patient Instructions   Rest   Alternate ice and warm compresses as discussed  Start muscle relaxer for muscle spasms, avoid driving while taking medication  Start steroid taper  Start Tylenol  Follow up with PCP or Ortho for further evaluation  Go to ER with worsening symptoms       Acute Low Back Pain, Ambulatory Care   GENERAL INFORMATION:   Acute low back pain  is discomfort in your lower back area that lasts for less than 12 weeks  The word acute is used to describe pain that starts suddenly, worsens quickly, and lasts for a short time  Common symptoms include the following:   · Back stiffness or spasms    · Pain down the back or side of one leg    · Holding yourself in an unusual position or posture to decrease your back pain    · Not being able to find a sitting position that is comfortable    · Slow increase in your pain for 24 to 48 hours after you stress your back    · Tenderness on your lower back or severe pain when you move your back  Seek immediate care for the following symptoms:   · Severe pain    · Sudden stiffness and heaviness in both buttocks down to both legs    · Numbness or weakness in one leg, or pain in both legs    · Numbness in your genital area or across your lower back    · Unable to control your urine or bowel movements  Treatment for acute low back pain  may include any of the following:  · Medicines:      ¨ NSAIDs  help decrease swelling and pain or fever  This medicine is available with or without a doctor's order  NSAIDs can cause stomach bleeding or kidney problems in certain people  If you take blood thinner medicine, always ask your healthcare provider if NSAIDs are safe for you  Always read the medicine label and follow directions  ¨ Muscle relaxers  help decrease muscle spasms pain  ¨ Prescription pain medicine  may be given  Ask how to take this medicine safely  · Surgery  may be needed if your pain is severe and other treatments do not work  Surgery may be needed for conditions of the lumbar spine, such as herniated disc or spinal stenosis  Manage your symptoms:   · Sleep on a firm mattress  If you do not have a firm mattress, have someone move your mattress to the floor for a few days  A piece of plywood under your mattress can also help make it firmer  · Apply ice  on your lower back for 15 to 20 minutes every hour or as directed   Use an ice pack, or put crushed ice in a plastic bag  Cover it with a towel  Ice helps prevent tissue damage and decreases swelling and pain  You can alternate ice and heat  · Apply heat  on your lower back for 20 to 30 minutes every 2 hours for as many days as directed  Heat helps decrease pain and muscle spasms  · Go to physical therapy  A physical therapist teaches you exercises to help improve movement and strength, and to decrease pain  Prevent acute low back pain:   · Use proper body mechanics  ¨ Bend at the hips and knees when you  objects  Do not bend from the waist  Use your leg muscles as you lift the load  Do not use your back  Keep the object close to your chest as you lift it  Try not to twist or lift anything above your waist     ¨ Change your position often when you stand for long periods of time  Rest one foot on a small box or footrest, and then switch to the other foot often  ¨ Try not to sit for long periods of time  When you do, sit in a straight-backed chair with your feet flat on the floor  Never reach, pull, or push while you are sitting  · Exercise regularly  Warm up before you exercise  Do exercises that strengthen your back muscles  Ask about the best exercise plan for you  · Maintain a healthy weight  Ask your healthcare provider how much you should weigh  Ask him to help you create a weight loss plan if you are overweight  Follow up with your healthcare provider as directed:  Return for a follow-up visit if you still have pain after 1 to 3 weeks of treatment  You may need to visit an orthopedist if your back pain lasts more than 6 to 12 weeks  Write down your questions so you remember to ask them during your visits  CARE AGREEMENT:   You have the right to help plan your care  Learn about your health condition and how it may be treated  Discuss treatment options with your caregivers to decide what care you want to receive  You always have the right to refuse treatment   The above information is an  only  It is not intended as medical advice for individual conditions or treatments  Talk to your doctor, nurse or pharmacist before following any medical regimen to see if it is safe and effective for you  © 2014 [address] is for End User's use only and may not be sold, redistributed or otherwise used for commercial purposes  All illustrations and images included in CareNotes® are the copyrighted property of [employer] , [employer]  or [employer]  Follow up with PCP if not improved, if symptoms are worse, go to the ER

## 2022-02-28 ENCOUNTER — OFFICE VISIT (OUTPATIENT)
Dept: URGENT CARE | Facility: MEDICAL CENTER | Age: 47
End: 2022-02-28

## 2022-02-28 VITALS
HEART RATE: 82 BPM | DIASTOLIC BLOOD PRESSURE: 70 MMHG | WEIGHT: 172 LBS | OXYGEN SATURATION: 100 % | BODY MASS INDEX: 31.46 KG/M2 | SYSTOLIC BLOOD PRESSURE: 124 MMHG | RESPIRATION RATE: 18 BRPM | TEMPERATURE: 99.3 F

## 2022-02-28 DIAGNOSIS — S39.012A STRAIN OF MUSCLE, FASCIA AND TENDON OF LOWER BACK, INITIAL ENCOUNTER: Primary | ICD-10-CM

## 2022-02-28 RX ORDER — OXYCODONE HYDROCHLORIDE AND ACETAMINOPHEN 5; 325 MG/1; MG/1
1 TABLET ORAL EVERY 6 HOURS PRN
COMMUNITY
Start: 2022-02-28

## 2022-02-28 RX ORDER — LIDOCAINE 50 MG/G
OINTMENT TOPICAL 3 TIMES DAILY PRN
COMMUNITY
Start: 2022-02-28 | End: 2023-02-28

## 2022-02-28 RX ORDER — PREDNISONE 20 MG/1
TABLET ORAL
COMMUNITY
Start: 2021-12-28

## 2022-03-01 NOTE — PROGRESS NOTES
Patient is here today for low back pain that started three days ago  Patient reports she had a tele-health yesterday and was prescribed Flexeril and predisnose for pain  Patient denies any pain control  Patient went to [facility] and they told her to continue medication as prescribed but if pain is that bad go directly to ED  Patient reports she came here because [facility] did not give her anything  Patient was told if pain is that significant she needs to go to ED  Patient was told we do not prescribe narcotics  Denies any loss of bowel or bladder control  Admits numbness and tingling in legs  Denies  Any injury  Patient would like appointment canceled and to go to ED  Patient was not seen

## 2022-04-08 DIAGNOSIS — N60.11 BILATERAL FIBROCYSTIC BREAST CHANGES: Primary | ICD-10-CM

## 2022-04-08 DIAGNOSIS — Z12.39 BREAST CANCER SCREENING, HIGH RISK PATIENT: ICD-10-CM

## 2022-04-08 DIAGNOSIS — N60.12 BILATERAL FIBROCYSTIC BREAST CHANGES: Primary | ICD-10-CM

## 2022-05-13 DIAGNOSIS — Z12.39 BREAST CANCER SCREENING, HIGH RISK PATIENT: ICD-10-CM

## 2022-05-13 DIAGNOSIS — N60.12 BILATERAL FIBROCYSTIC BREAST CHANGES: ICD-10-CM

## 2022-05-13 DIAGNOSIS — N60.11 BILATERAL FIBROCYSTIC BREAST CHANGES: ICD-10-CM

## 2022-05-16 DIAGNOSIS — Z12.39 BREAST CANCER SCREENING, HIGH RISK PATIENT: ICD-10-CM

## 2022-05-16 DIAGNOSIS — N60.11 BILATERAL FIBROCYSTIC BREAST CHANGES: Primary | ICD-10-CM

## 2022-05-16 DIAGNOSIS — R92.8 ABNORMAL ULTRASOUND OF BREAST: ICD-10-CM

## 2022-05-16 DIAGNOSIS — N60.12 BILATERAL FIBROCYSTIC BREAST CHANGES: Primary | ICD-10-CM

## 2022-06-01 ENCOUNTER — HOSPITAL ENCOUNTER (OUTPATIENT)
Dept: RADIOLOGY | Facility: HOSPITAL | Age: 47
Discharge: HOME | End: 2022-06-01
Attending: SURGERY
Payer: COMMERCIAL

## 2022-06-01 DIAGNOSIS — N60.11 BILATERAL FIBROCYSTIC BREAST CHANGES: ICD-10-CM

## 2022-06-01 DIAGNOSIS — N60.12 BILATERAL FIBROCYSTIC BREAST CHANGES: ICD-10-CM

## 2022-06-01 PROCEDURE — C8908 MRI W/O FOL W/CONT, BREAST,: HCPCS

## 2022-06-01 PROCEDURE — A9585 GADOBUTROL INJECTION: HCPCS | Performed by: RADIOLOGY

## 2022-06-01 PROCEDURE — G1004 CDSM NDSC: HCPCS

## 2022-06-01 RX ADMIN — GADOBUTROL 8 ML: 604.72 INJECTION INTRAVENOUS at 18:00

## 2022-06-13 ENCOUNTER — HOSPITAL ENCOUNTER (OUTPATIENT)
Dept: ULTRASOUND IMAGING | Facility: CLINIC | Age: 47
Discharge: HOME | End: 2022-06-13
Payer: COMMERCIAL

## 2022-06-13 VITALS — BODY MASS INDEX: 31.64 KG/M2 | WEIGHT: 171.96 LBS | HEIGHT: 62 IN

## 2022-06-13 DIAGNOSIS — R92.8 ABNORMAL MRI, BREAST: ICD-10-CM

## 2022-06-13 PROCEDURE — 76642 ULTRASOUND BREAST LIMITED: CPT

## 2022-06-14 DIAGNOSIS — N60.12 BILATERAL FIBROCYSTIC BREAST CHANGES: Primary | ICD-10-CM

## 2022-06-14 DIAGNOSIS — R92.8 ABNORMAL ULTRASOUND OF BREAST: ICD-10-CM

## 2022-06-14 DIAGNOSIS — Z12.39 BREAST CANCER SCREENING, HIGH RISK PATIENT: ICD-10-CM

## 2022-06-14 DIAGNOSIS — N60.11 BILATERAL FIBROCYSTIC BREAST CHANGES: Primary | ICD-10-CM

## 2022-09-19 ENCOUNTER — OFFICE VISIT (OUTPATIENT)
Dept: FAMILY MEDICINE CLINIC | Facility: CLINIC | Age: 47
End: 2022-09-19
Payer: COMMERCIAL

## 2022-09-19 VITALS
WEIGHT: 170.2 LBS | SYSTOLIC BLOOD PRESSURE: 120 MMHG | HEIGHT: 62 IN | BODY MASS INDEX: 31.32 KG/M2 | HEART RATE: 75 BPM | DIASTOLIC BLOOD PRESSURE: 80 MMHG | OXYGEN SATURATION: 100 % | TEMPERATURE: 97.7 F

## 2022-09-19 DIAGNOSIS — M54.50 CHRONIC BILATERAL LOW BACK PAIN WITHOUT SCIATICA: ICD-10-CM

## 2022-09-19 DIAGNOSIS — G89.29 CHRONIC BILATERAL LOW BACK PAIN WITHOUT SCIATICA: ICD-10-CM

## 2022-09-19 DIAGNOSIS — R35.0 URINARY FREQUENCY: Primary | ICD-10-CM

## 2022-09-19 LAB
SL AMB  POCT GLUCOSE, UA: NEGATIVE
SL AMB LEUKOCYTE ESTERASE,UA: ABNORMAL
SL AMB POCT BILIRUBIN,UA: NEGATIVE
SL AMB POCT BLOOD,UA: ABNORMAL
SL AMB POCT CLARITY,UA: ABNORMAL
SL AMB POCT COLOR,UA: YELLOW
SL AMB POCT KETONES,UA: NEGATIVE
SL AMB POCT NITRITE,UA: NEGATIVE
SL AMB POCT PH,UA: 6.5
SL AMB POCT SPECIFIC GRAVITY,UA: 1.01
SL AMB POCT URINE PROTEIN: NEGATIVE
SL AMB POCT UROBILINOGEN: 0.2

## 2022-09-19 PROCEDURE — 81002 URINALYSIS NONAUTO W/O SCOPE: CPT | Performed by: FAMILY MEDICINE

## 2022-09-19 PROCEDURE — 99214 OFFICE O/P EST MOD 30 MIN: CPT | Performed by: FAMILY MEDICINE

## 2022-09-19 NOTE — PROGRESS NOTES
Assessment/Plan:       Problem List Items Addressed This Visit        Other    Chronic bilateral low back pain without sciatica     Continues to follow with Pain Medicine, has not had relief from injections reports recent MRI         Urinary frequency - Primary     Given feeling of incomplete voiding will check BMP and ultrasound, possible overactive bladder, consider further treatment after results         Relevant Orders    POCT urine dip (Completed)    Basic metabolic panel    US kidney and bladder            Subjective:      Patient ID: Sera Zapata is a 41 y o  female  HPI     62-year-old female past medical history of chronic low back pain, follows with a spine specialist and has had injections in the past without relief  During the last week she has noticed that the pain usually occurs prior to urination, this morning she had pain with urination  She has had no blood in her urine      No recent fevers or illness    The following portions of the patient's history were reviewed and updated as appropriate: allergies, current medications, past family history, past medical history, past social history, past surgical history and problem list       Current Outpatient Medications:     albuterol (2 5 mg/3 mL) 0 083 % nebulizer solution, Take 1 vial (2 5 mg total) by nebulization every 6 (six) hours as needed for wheezing, Disp: 40 vial, Rfl: 0    albuterol (PROVENTIL HFA,VENTOLIN HFA) 90 mcg/act inhaler, Inhale 2 puffs every 6 (six) hours as needed for wheezing or shortness of breath, Disp: 2 Inhaler, Rfl: 1    budesonide-formoterol (SYMBICORT) 160-4 5 mcg/act inhaler, Inhale 2 puffs 2 (two) times a day, Disp: 30 6 g, Rfl: 2    ibuprofen (MOTRIN) 200 mg tablet, Take by mouth every 6 (six) hours as needed for mild pain, Disp: , Rfl:     lidocaine (XYLOCAINE) 5 % ointment, Apply topically Three times daily as needed, Disp: , Rfl:     omeprazole (PriLOSEC) 20 mg delayed release capsule, Take 1 capsule (20 mg total) by mouth daily, Disp: 90 capsule, Rfl: 1    cyclobenzaprine (FLEXERIL) 10 mg tablet, Take 1 tablet (10 mg total) by mouth 3 (three) times a day as needed for muscle spasms for up to 5 days, Disp: 15 tablet, Rfl: 0     Review of Systems   Constitutional: Negative for activity change and appetite change  Respiratory: Negative for apnea and chest tightness  Gastrointestinal: Negative for abdominal distention and abdominal pain  Musculoskeletal: Negative for arthralgias and back pain  Objective:      /80 (BP Location: Right arm, Cuff Size: Standard)   Pulse 75   Temp 97 7 °F (36 5 °C) (Tympanic Core)   Ht 5' 2" (1 575 m)   Wt 77 2 kg (170 lb 3 2 oz)   SpO2 100%   BMI 31 13 kg/m²          Physical Exam  Constitutional:       Appearance: Normal appearance  Cardiovascular:      Rate and Rhythm: Normal rate and regular rhythm  Pulses: Normal pulses  Heart sounds: Normal heart sounds  Pulmonary:      Effort: Pulmonary effort is normal       Breath sounds: Normal breath sounds  Abdominal:      General: Abdomen is flat  Tenderness: There is no abdominal tenderness  Musculoskeletal:         General: Normal range of motion  Comments: No CVA tenderness   Neurological:      Mental Status: She is alert             Cris Reid MD

## 2022-09-19 NOTE — ASSESSMENT & PLAN NOTE
Given feeling of incomplete voiding will check BMP and ultrasound, possible overactive bladder, consider further treatment after results  UA reviewed and not suggestive of infection

## 2022-09-19 NOTE — PATIENT INSTRUCTIONS
1  Urinary frequency  -     POCT urine dip  -     Basic metabolic panel; Future  -     US kidney and bladder; Future; Expected date: 09/19/2022    2   Acute bilateral low back pain without sciatica

## 2022-09-28 ENCOUNTER — HOSPITAL ENCOUNTER (OUTPATIENT)
Dept: ULTRASOUND IMAGING | Facility: HOSPITAL | Age: 47
Discharge: HOME | End: 2022-09-28
Attending: FAMILY MEDICINE
Payer: COMMERCIAL

## 2022-09-28 ENCOUNTER — APPOINTMENT (OUTPATIENT)
Dept: LAB | Facility: HOSPITAL | Age: 47
End: 2022-09-28
Payer: COMMERCIAL

## 2022-09-28 DIAGNOSIS — R35.0 URINARY FREQUENCY: ICD-10-CM

## 2022-09-28 LAB
ANION GAP SERPL CALCULATED.3IONS-SCNC: 7 MMOL/L (ref 4–13)
BUN SERPL-MCNC: 9 MG/DL (ref 5–25)
CALCIUM SERPL-MCNC: 9.2 MG/DL (ref 8.3–10.1)
CHLORIDE SERPL-SCNC: 105 MMOL/L (ref 96–108)
CO2 SERPL-SCNC: 27 MMOL/L (ref 21–32)
CREAT SERPL-MCNC: 0.56 MG/DL (ref 0.6–1.3)
GFR SERPL CREATININE-BSD FRML MDRD: 112 ML/MIN/1.73SQ M
GLUCOSE SERPL-MCNC: 135 MG/DL (ref 65–140)
POTASSIUM SERPL-SCNC: 3.6 MMOL/L (ref 3.5–5.3)
SODIUM SERPL-SCNC: 139 MMOL/L (ref 135–147)

## 2022-09-28 PROCEDURE — 36415 COLL VENOUS BLD VENIPUNCTURE: CPT

## 2022-09-28 PROCEDURE — 76770 US EXAM ABDO BACK WALL COMP: CPT

## 2022-09-28 PROCEDURE — 80048 BASIC METABOLIC PNL TOTAL CA: CPT

## 2022-12-22 ENCOUNTER — TELEPHONE (OUTPATIENT)
Dept: SURGERY | Facility: CLINIC | Age: 47
End: 2022-12-22

## 2022-12-22 NOTE — TELEPHONE ENCOUNTER
We received a notification from Junaid Griffith indicating she has not been scheduled for the recommended short-term follow up mammo/ultrasound from 5/2022  After investigating it appeared the patient was scheduled with SALONI RBC on 12/14/22 however she no-showed for the visit  I attempted to reach patient to question and request she reschedule however her voicemail is full  A letter will be sent to request a call back

## 2023-01-23 ENCOUNTER — HOSPITAL ENCOUNTER (OUTPATIENT)
Dept: MAMMOGRAPHY | Facility: CLINIC | Age: 48
Discharge: HOME | End: 2023-01-23

## 2023-01-23 ENCOUNTER — HOSPITAL ENCOUNTER (OUTPATIENT)
Dept: ULTRASOUND IMAGING | Facility: CLINIC | Age: 48
Discharge: HOME | End: 2023-01-23

## 2023-01-23 VITALS — BODY MASS INDEX: 31.28 KG/M2 | WEIGHT: 170 LBS | HEIGHT: 62 IN

## 2023-01-23 DIAGNOSIS — R92.8 ABNORMAL ULTRASOUND OF BREAST: ICD-10-CM

## 2023-01-23 DIAGNOSIS — N60.11 BILATERAL FIBROCYSTIC BREAST CHANGES: ICD-10-CM

## 2023-01-23 DIAGNOSIS — Z12.39 BREAST CANCER SCREENING, HIGH RISK PATIENT: ICD-10-CM

## 2023-01-23 DIAGNOSIS — N60.12 BILATERAL FIBROCYSTIC BREAST CHANGES: ICD-10-CM

## 2023-01-24 NOTE — RESULT ENCOUNTER NOTE
Please call pt with abnormal results and schedule follow up  These make sure she has the MRI ordered and or the follow-up mammogram   Both of them  If I have not seen her in the office recently it looks like I should probably see her to review this

## 2023-02-02 NOTE — PROGRESS NOTES
Assessment/Plan:   Selma Marquis is a 57 y  o female who is here for The primary encounter diagnosis was Abnormal mammogram  Diagnoses of Family history of breast cancer and Breast cancer screening, high risk patient were also pertinent to this visit      high risk screening due to family history     family have breast cancer in maternal aunt, mother and sister  Sister with history of ovarian cancer      Maria M model lifetime risk is 31 08% on her most recent mammogram      Mammogram from January 2023 shows  Left:  an 18 mm simple cyst on the left at 12 o'clock position correlating with ultrasound and mammogram   Right breast calcifications at the 1 o'clock position correlating with ultrasound and mammogram as well as 7 o'clock position  Recommendation of 6-month follow-up calcifications  Recommendation was possible consideration of MRI screening in June 2023 and I concur with this  Plan:  #1  MRI high risk screening and follow-up on abnormal mammogram in June 2023  2   6-month follow-up right breast mammogram and ultrasound depending on MRI results, this would be also in June or July 2023   3    Genetic testing was performed in June 2021 and was negative for any variations of clinical significance  1 variant of uncertain significance  4   continue GYN exams  Eliot the importance of this considering ovarian cancer history in her family  5   lifestyle modifications including weight loss, [employer] diet, avoiding processed foods, vitamin D and calcium supplementation as needed, was discussed  6   Rash of the right breast in the periareolar position  Consider biopsy if this does not resolve with local treatments    Is not consistent with inflammatory disease and might more be a mild fungal or yeast infection      Dustin  Risk of 31%  Family history of breast cancer in mother, maternal aunt, and ovarian cancer in her sister  ______________________________________________________  CC: Follow-up (Discuss mammo results  )    HPI:  Scott Valladares is a 57 y  o female who was referred for evaluation of Follow-up (Discuss mammo results  )    Currently  No new complaints or findings  Strong family history of breast cancer and ovarian cancer  Patient is here for genetic testing       ROS:  General ROS: negative  negative for - chills, fatigue, fever or night sweats, weight loss  Respiratory ROS: no cough, shortness of breath, or wheezing  Cardiovascular ROS: no chest pain or dyspnea on exertion  Genito-Urinary ROS: no dysuria, trouble voiding, or hematuria  Musculoskeletal ROS: negative for - gait disturbance, joint pain or muscle pain  Neurological ROS: no TIA or stroke symptoms  Gastrointestinal: see HPI  No abdominal pain, melena, BRB unless specified in HPI  Skin ROS: no new rashes or lesions   Lymphatic ROS: no new adenopathy noted by pt  GYN ROS: see HPI, no new GYN history or bleeding noted  Psy ROS: no new mental or behavioral disturbances       Patient Active Problem List   Diagnosis   • Anxiety   • Bilateral fibrocystic breast changes   • Extrinsic asthma without status asthmaticus   • Fibroadenosis of breast   • Esophageal reflux   • IBS (irritable bowel syndrome)   • Macromastia   • Adhesive capsulitis of right shoulder   • Class 1 obesity   • Chronic bilateral low back pain without sciatica   • Urinary frequency         Allergies:  Patient has no known allergies        Current Outpatient Medications:   •  albuterol (2 5 mg/3 mL) 0 083 % nebulizer solution, Take 1 vial (2 5 mg total) by nebulization every 6 (six) hours as needed for wheezing, Disp: 40 vial, Rfl: 0  •  albuterol (PROVENTIL HFA,VENTOLIN HFA) 90 mcg/act inhaler, Inhale 2 puffs every 6 (six) hours as needed for wheezing or shortness of breath, Disp: 2 Inhaler, Rfl: 1  •  budesonide-formoterol (Symbicort) 160-4 5 mcg/act inhaler, Inhale 2 puffs 2 (two) times a day, Disp: 30 6 g, Rfl: 1  •  ibuprofen (MOTRIN) 200 mg tablet, Take by mouth every 6 (six) hours as needed for mild pain, Disp: , Rfl:   •  lidocaine (XYLOCAINE) 5 % ointment, Apply topically Three times daily as needed, Disp: , Rfl:   •  omeprazole (PriLOSEC) 20 mg delayed release capsule, Take 1 capsule (20 mg total) by mouth daily, Disp: 90 capsule, Rfl: 1  •  cyclobenzaprine (FLEXERIL) 10 mg tablet, Take 1 tablet (10 mg total) by mouth 3 (three) times a day as needed for muscle spasms for up to 5 days (Patient not taking: Reported on 2/6/2023), Disp: 15 tablet, Rfl: 0    Past Medical History:   Diagnosis Date   • Anemia    • Asthma    • History of transfusion     approx 10 yrs ago   • Low back pain    • Neck pain    • Wears glasses        Past Surgical History:   Procedure Laterality Date   • BREAST LUMPECTOMY     • COLONOSCOPY     • DILATION AND CURETTAGE OF UTERUS     • ESOPHAGOGASTRODUODENOSCOPY     • EYE SURGERY     • HYSTERECTOMY      age 37   • OOPHORECTOMY Left     age 37   • OVARY SURGERY     • FL BREAST REDUCTION Bilateral 9/19/2017    Procedure: REDUCTION MAMMOPLASTY BREAST;  Surgeon: Jyoti Coffey MD;  Location: [facility];  Service: Plastics   • REDUCTION MAMMAPLASTY Bilateral 2017       Family History   Problem Relation Age of Onset   • Breast cancer Mother 54   • Diabetes Father    • Ovarian cancer Sister 45   • Breast cancer Maternal Aunt 50        reports that she has never smoked  She has never used smokeless tobacco  She reports current alcohol use  She reports that she does not use drugs      Labs:   Lab Results   Component Value Date    WBC 5 31 08/08/2019    HGB 11 4 (L) 08/08/2019    HCT 36 0 08/08/2019    MCV 87 08/08/2019     08/08/2019     Lab Results   Component Value Date    K 3 6 09/28/2022     09/28/2022    CO2 27 09/28/2022    BUN 9 09/28/2022    CREATININE 0 56 (L) 09/28/2022    GLUF 99 08/08/2019    CALCIUM 9 2 09/28/2022    AST 13 08/08/2019    ALT 21 08/08/2019    ALKPHOS 64 08/08/2019    EGFR 112 09/28/2022         Imaging: No new pertinent imaging studies  PHYSICAL EXAM    Vitals:    02/06/23 1502   BP: 120/70   Pulse: 76   Temp: 97 5 °F (36 4 °C)          PHYSICAL EXAM  General Appearance:    Alert, cooperative, no distress,    Head:    Normocephalic without obvious abnormality   Eyes:    PERRL, conjunctiva/corneas clear, EOM's intact        Neck:   Supple, no adenopathy, no JVD   Back:     Symmetric, no spinal or CVA tenderness   Lungs:     Clear to auscultation bilaterally, no wheezing or rhonchi   Heart:    Regular rate and rhythm, S1 and S2 normal, no murmur   Abdomen:     Soft,NTND, +BS   Extremities:   Extremities normal  No clubbing, cyanosis or edema   Psych:   Normal Affect   Neurologic:   CNII-XII intact  Strength symmetric, speech intact    Bilateral breast exam is benign  She does have a history of bilateral mammoplasty reduction, and this does lend itself to calcifications  Is an interesting periareolar rash at the 12 to 3 o'clock position of the right breast along the scar  She says this is growing  If it continues to change in size and character, I recommended biopsy  Some portions of this record may have been generated with voice recognition software  There may be translation, syntax,  or grammatical errors  Occasional wrong word or "sound-a-like" substitutions may have occurred due to the inherent limitations of the voice recognition software  Read the chart carefully and recognize, using context, where substitutions may have occurred  If you have any questions, please contact the dictating provider for clarification or correction, as needed  This encounter has been coded by a non-certified coder         Uri Onofre MD    Date: 2/6/2023 Time: 3:43 PM

## 2023-02-06 ENCOUNTER — OFFICE VISIT (OUTPATIENT)
Dept: SURGERY | Facility: CLINIC | Age: 48
End: 2023-02-06

## 2023-02-06 VITALS
TEMPERATURE: 97.5 F | WEIGHT: 168.2 LBS | BODY MASS INDEX: 30.95 KG/M2 | DIASTOLIC BLOOD PRESSURE: 70 MMHG | SYSTOLIC BLOOD PRESSURE: 120 MMHG | HEART RATE: 76 BPM | HEIGHT: 62 IN

## 2023-02-06 DIAGNOSIS — R92.8 ABNORMAL MAMMOGRAM: Primary | ICD-10-CM

## 2023-02-06 DIAGNOSIS — R92.8 ABNORMAL ULTRASOUND OF BREAST: Primary | ICD-10-CM

## 2023-02-06 DIAGNOSIS — Z12.39 BREAST CANCER SCREENING, HIGH RISK PATIENT: ICD-10-CM

## 2023-02-06 DIAGNOSIS — Z80.3 FAMILY HISTORY OF BREAST CANCER: ICD-10-CM

## 2023-04-13 PROBLEM — R10.31 ABDOMINAL PAIN, RIGHT LOWER QUADRANT: Status: ACTIVE | Noted: 2022-05-03

## 2023-05-25 ENCOUNTER — OFFICE VISIT (OUTPATIENT)
Dept: URGENT CARE | Facility: MEDICAL CENTER | Age: 48
End: 2023-05-25

## 2023-05-25 VITALS
DIASTOLIC BLOOD PRESSURE: 80 MMHG | RESPIRATION RATE: 20 BRPM | HEART RATE: 76 BPM | SYSTOLIC BLOOD PRESSURE: 122 MMHG | TEMPERATURE: 99.7 F | OXYGEN SATURATION: 99 %

## 2023-05-25 DIAGNOSIS — J30.9 ALLERGIC RHINITIS, UNSPECIFIED SEASONALITY, UNSPECIFIED TRIGGER: ICD-10-CM

## 2023-05-25 DIAGNOSIS — J45.901 EXACERBATION OF ASTHMA, UNSPECIFIED ASTHMA SEVERITY, UNSPECIFIED WHETHER PERSISTENT: Primary | ICD-10-CM

## 2023-05-25 RX ORDER — PREDNISONE 20 MG/1
40 TABLET ORAL ONCE
Status: COMPLETED | OUTPATIENT
Start: 2023-05-25 | End: 2023-05-25

## 2023-05-25 RX ORDER — ALBUTEROL SULFATE 2.5 MG/3ML
2.5 SOLUTION RESPIRATORY (INHALATION) EVERY 6 HOURS PRN
Qty: 48 ML | Refills: 0 | Status: SHIPPED | OUTPATIENT
Start: 2023-05-25 | End: 2023-06-24

## 2023-05-25 RX ORDER — PREDNISONE 20 MG/1
TABLET ORAL
Qty: 18 TABLET | Refills: 0 | Status: SHIPPED | OUTPATIENT
Start: 2023-05-25

## 2023-05-25 RX ORDER — MONTELUKAST SODIUM 10 MG/1
10 TABLET ORAL
Qty: 30 TABLET | Refills: 0 | Status: SHIPPED | OUTPATIENT
Start: 2023-05-25

## 2023-05-25 RX ORDER — ALBUTEROL SULFATE 90 UG/1
2 AEROSOL, METERED RESPIRATORY (INHALATION) EVERY 6 HOURS PRN
Qty: 19 G | Refills: 0 | Status: SHIPPED | OUTPATIENT
Start: 2023-05-25

## 2023-05-25 RX ORDER — ALBUTEROL SULFATE 2.5 MG/3ML
2.5 SOLUTION RESPIRATORY (INHALATION) ONCE
Status: COMPLETED | OUTPATIENT
Start: 2023-05-25 | End: 2023-05-25

## 2023-05-25 RX ADMIN — PREDNISONE 40 MG: 20 TABLET ORAL at 20:37

## 2023-05-25 RX ADMIN — ALBUTEROL SULFATE 2.5 MG: 2.5 SOLUTION RESPIRATORY (INHALATION) at 20:37

## 2023-05-26 NOTE — PROGRESS NOTES
[facility]        NAME: Igor Peña is a 57 y o  female  : 1975    MRN: [id number]  DATE: May 25, 2023  TIME: 8:36 PM    Assessment and Plan   Exacerbation of asthma, unspecified asthma severity, unspecified whether persistent [J45 [id number]]  1  Exacerbation of asthma, unspecified asthma severity, unspecified whether persistent  albuterol inhalation solution 2 5 mg    predniSONE tablet 40 mg    predniSONE 20 mg tablet    montelukast (SINGULAIR) 10 mg tablet    albuterol (2 5 mg/3 mL) 0 083 % nebulizer solution    albuterol (PROVENTIL HFA,VENTOLIN HFA) 90 mcg/act inhaler      2  Allergic rhinitis, unspecified seasonality, unspecified trigger  montelukast (SINGULAIR) 10 mg tablet            Patient Instructions       Follow up with PCP in 3-5 days  Proceed to  ER if symptoms worsen  Chief Complaint     Chief Complaint   Patient presents with   • Cough     Pt C/O cough with a tight feeling chest, reports allergies and asthma with a cough for 2 weeks  History of Present Illness       82-year-old female here today with complaint of cough and shortness of breath for approximate 2 weeks  She has a known history of seasonal allergies and asthma  She has not needed to use her rescue inhaler almost daily with no significant improvement  Cough seems to be nonproductive  She has had occasional wheezing  She also uses albuterol nebulizer also with no noticeable improvement  Also describes nasal congestion, postnasal drip  Sore throat secondary to postnasal drip but denies fever or recent sick exposure  She informs me that she has been using for the longest time Xyzal stopped taking allergy medicine  She was using Symbicort inhaler twice a day but ran out of prescription a while ago  Denies any severe asthma attack  Today she felt worsening tightness in her chest and wheezing decided to come to urgent care for evaluation        Review of Systems   Review of Systems   Constitutional: Negative  HENT: Positive for congestion and postnasal drip  Respiratory: Positive for cough, shortness of breath and wheezing            Current Medications       Current Outpatient Medications:   •  albuterol (2 5 mg/3 mL) 0 083 % nebulizer solution, Take 3 mL (2 5 mg total) by nebulization every 6 (six) hours as needed for wheezing or shortness of breath, Disp: 48 mL, Rfl: 0  •  albuterol (PROVENTIL HFA,VENTOLIN HFA) 90 mcg/act inhaler, Inhale 2 puffs every 6 (six) hours as needed for wheezing, Disp: 19 g, Rfl: 0  •  budesonide-formoterol (Symbicort) 160-4 5 mcg/act inhaler, Inhale 2 puffs 2 (two) times a day, Disp: 30 6 g, Rfl: 1  •  ibuprofen (MOTRIN) 200 mg tablet, Take by mouth every 6 (six) hours as needed for mild pain, Disp: , Rfl:   •  montelukast (SINGULAIR) 10 mg tablet, Take 1 tablet (10 mg total) by mouth daily at bedtime, Disp: 30 tablet, Rfl: 0  •  omeprazole (PriLOSEC) 20 mg delayed release capsule, Take 1 capsule (20 mg total) by mouth daily, Disp: 90 capsule, Rfl: 1  •  predniSONE 20 mg tablet, 3 tablets once daily day 1 through 3 then 2 tablets daily day 4 through 6 then 1 tablet daily day 7 through 9 , Disp: 18 tablet, Rfl: 0  •  albuterol (2 5 mg/3 mL) 0 083 % nebulizer solution, Take 1 vial (2 5 mg total) by nebulization every 6 (six) hours as needed for wheezing (Patient not taking: Reported on 5/25/2023), Disp: 40 vial, Rfl: 0  •  albuterol (PROVENTIL HFA,VENTOLIN HFA) 90 mcg/act inhaler, Inhale 2 puffs every 6 (six) hours as needed for wheezing or shortness of breath (Patient not taking: Reported on 5/25/2023), Disp: 2 Inhaler, Rfl: 1  •  cyclobenzaprine (FLEXERIL) 10 mg tablet, Take 1 tablet (10 mg total) by mouth 3 (three) times a day as needed for muscle spasms for up to 5 days (Patient not taking: Reported on 2/6/2023), Disp: 15 tablet, Rfl: 0  •  lidocaine (XYLOCAINE) 5 % ointment, Apply topically Three times daily as needed, Disp: , Rfl:     Current Facility-Administered Medications:   •  albuterol inhalation solution 2 5 mg, 2 5 mg, Nebulization, Once, Abrahan Levine MD  •  predniSONE tablet 40 mg, 40 mg, Oral, Once, Abrahan Levine MD    Current Allergies     Allergies as of 05/25/2023   • (No Known Allergies)            The following portions of the patient's history were reviewed and updated as appropriate: allergies, current medications, past family history, past medical history, past social history, past surgical history and problem list      Past Medical History:   Diagnosis Date   • Anemia    • Asthma    • History of transfusion     approx 10 yrs ago   • Low back pain    • Neck pain    • Wears glasses        Past Surgical History:   Procedure Laterality Date   • BREAST LUMPECTOMY     • COLONOSCOPY     • DILATION AND CURETTAGE OF UTERUS     • ESOPHAGOGASTRODUODENOSCOPY     • EYE SURGERY     • HYSTERECTOMY      age 37   • OOPHORECTOMY Left     age 37   • OVARY SURGERY     • [place] BREAST REDUCTION Bilateral 9/19/2017    Procedure: REDUCTION MAMMOPLASTY BREAST;  Surgeon: Harish Wick MD;  Location: [facility];  Service: Plastics   • REDUCTION MAMMAPLASTY Bilateral 2017       Family History   Problem Relation Age of Onset   • Breast cancer Mother 54   • Diabetes Father    • Ovarian cancer Sister 45   • Breast cancer Maternal Aunt 50         Medications have been verified  Objective   /80 (BP Location: Left arm, Patient Position: Sitting, Cuff Size: Large)   Pulse 76   Temp 99 7 °F (37 6 °C) (Tympanic)   Resp 20   SpO2 99%   No LMP recorded  Patient has had a hysterectomy  Physical Exam     Physical Exam  Vitals and nursing note reviewed  Constitutional:       Appearance: Normal appearance  HENT:      Nose:      Comments: Hypertrophic boggy right turbinate with clear mucoid discharge     Mouth/Throat:      Comments: Cobblestoning observed posterior pharynx  Pulmonary:      Breath sounds: Wheezing present        Comments: Decreased breath sounds bilaterally  Musculoskeletal:      Cervical back: Normal range of motion and neck supple  Neurological:      Mental Status: She is alert

## 2023-05-26 NOTE — PATIENT INSTRUCTIONS
Patient given albuterol nebulizer solution in the office, also given prednisone 40 mg orally in the office  After the nebulizer treatment, patient referred to symptomatic improvement  Auscultation reveals increased air entry mild expiratory wheeze  I prescribed albuterol nebulizer solution for her nebulizer at home every 6 hours for 7 days then taper as needed, Proventil inhaler to use as needed, prednisone tapering from 60 down to 20 mg over 9-day which she is to start tomorrow  Singulair 10 mg 1 tablet at night first dose tonight  Strongly encourage patient to schedule appointment soon with her allergist   If symptoms worsen tonight, she is to go immediately to the emergency room  Allergic Rhinitis   WHAT YOU NEED TO KNOW:   Allergic rhinitis, or hay fever, is swelling of the inside of your nose  The swelling is a reaction to allergens in the air  An allergen can be anything that causes an allergic reaction  Allergies to weeds, grass, trees, or mold often cause seasonal allergic rhinitis  Indoor dust mites, cockroaches, pet dander, or mold can also cause allergic rhinitis  DISCHARGE INSTRUCTIONS:   Call 911 for the following: You have chest pain or shortness of breath  Return to the emergency department if:   You have severe pain  You cough up blood  Contact your healthcare provider if:   You have a fever  You have ear or sinus pain, or a headache  Your symptoms get worse, even after treatment  You have yellow, green, brown, or bloody mucus coming from your nose  Your nose is bleeding or you have pain inside your nose  You have trouble sleeping because of your symptoms  You have questions or concerns about your condition or care  Medicines:   Medicines  help decrease your symptoms and clear your stuffy nose  Take your medicine as directed  Contact your healthcare provider if you think your medicine is not helping or if you have side effects   Tell him of her if you are allergic to any medicine  Keep a list of the medicines, vitamins, and herbs you take  Include the amounts, and when and why you take them  Bring the list or the pill bottles to follow-up visits  Carry your medicine list with you in case of an emergency  How to manage allergic rhinitis:  The best way to manage allergic rhinitis is to avoid allergens that can trigger your symptoms  Any of the following may help decrease your symptoms:  Rinse your nose and sinuses  with a salt water solution or use a salt water nasal spray  This will help thin the mucus in your nose and rinse away pollen and dirt  It will also help reduce swelling so you can breathe normally  Ask your healthcare provider how often to rinse your nose  Reduce exposure to dust mites  Wash sheets and towels in hot water every week  Cover your pillows and mattresses with allergen-free covers  Limit the number of stuffed animals and soft toys your child has  Wash your child's toys in hot water regularly  Vacuum weekly and use a vacuum  with an air filter  If possible, get rid of carpets and curtains  These collect dust and dust mites  Reduce exposure to pollen  Keep windows and doors closed in your house and car  Stay inside when air pollution or the pollen count is high  Run your air conditioner on recycle, and change air filters often  Shower and wash your hair before bed every night to rinse away pollen  Reduce exposure to pet dander  If possible, do not keep cats, dogs, birds, or other pets  If you do keep pets in your home, keep them out of bedrooms and carpeted rooms  Bathe them often  Reduce exposure to mold  Do not spend time in basements  Choose artificial plants instead of live plants  Keep your home's humidity at less than 45%  Do not have ponds or standing water in your home or yard  Do not smoke  Avoid others who smoke   Ask your healthcare provider for information if you currently smoke and need help to quit     Follow up with your healthcare provider as directed: You may need to see an allergist often to control your symptoms  Write down your questions so you remember to ask them during your visits  © Copyright [employer] 2022 Information is for End User's use only and may not be sold, redistributed or otherwise used for commercial purposes  All illustrations and images included in CareNotes® are the copyrighted property of A D A M , Inc  or Kimmy Jackman   The above information is an  only  It is not intended as medical advice for individual conditions or treatments  Talk to your doctor, nurse or pharmacist before following any medical regimen to see if it is safe and effective for you  Asthma   WHAT YOU NEED TO KNOW:   Asthma is a lung disease that makes breathing difficult  Chronic inflammation and reactions to triggers narrow the airways in the lungs  Asthma can become life-threatening if it is not managed  DISCHARGE INSTRUCTIONS:   Call your local emergency number (911 in the [address] [facility]) if:   You have severe shortness of breath  The skin around your neck and ribs pulls in with each breath  Your peak flow numbers are in the red zone of your AAP  Return to the emergency department if:   You have shortness of breath, even after you take your short-term medicine as directed  Your lips or nails turn blue or gray  Call your doctor or asthma specialist if:   You run out of medicine before your next refill is due  Your symptoms get worse  You need to take more medicine than usual to control your symptoms  You have questions or concerns about your condition or care  Medicines:   Medicines  may be used to decrease inflammation, open airways, and make it easier to breathe  Medicines may be inhaled, taken as a pill, or injected  Short-term medicines relieve your symptoms quickly  Long-term medicines are used to prevent future asthma attacks   Other medicines may be needed if your regular medicines are not able to prevent attacks  You may also need medicine to help control your allergies  Ask your healthcare provider for more information about any medicine you are given and how to take it safely  Take your medicine as directed  Contact your healthcare provider if you think your medicine is not helping or if you have side effects  Tell your provider if you are allergic to any medicine  Keep a list of the medicines, vitamins, and herbs you take  Include the amounts, and when and why you take them  Bring the list or the pill bottles to follow-up visits  Carry your medicine list with you in case of an emergency  Manage your symptoms and prevent future attacks: Follow your Asthma Action Plan (AAP)  This is a written plan that you and your healthcare provider create  It explains which medicine you need and when to change doses if necessary  It also explains how you can monitor symptoms and use a peak flow meter  The meter measures how well your lungs are working  Manage other health conditions , such as allergies, acid reflux, and sleep apnea  Identify and avoid triggers  These may include pets, dust mites, mold, and cockroaches  Do not smoke or be around others who smoke  Nicotine and other chemicals in cigarettes and cigars can cause lung damage  Ask your healthcare provider for information if you currently smoke and need help to quit  E-cigarettes or smokeless tobacco still contain nicotine  Talk to your healthcare provider before you use these products  Ask about the flu vaccine  The flu can make your asthma worse  You may need a yearly flu shot  Follow up with your healthcare provider as directed: You will need to return to make sure your medicine is working and your symptoms are controlled  You may be referred to an asthma or allergy specialist  Luma Borges may be asked to keep a record of your peak flow values and bring it with you to your appointments  Write down your questions so you remember to ask them during your visits  © Copyright Graz[employer] Gonzalez 2022 Information is for End User's use only and may not be sold, redistributed or otherwise used for commercial purposes  The above information is an  only  It is not intended as medical advice for individual conditions or treatments  Talk to your doctor, nurse or pharmacist before following any medical regimen to see if it is safe and effective for you

## 2023-07-25 ENCOUNTER — HOSPITAL ENCOUNTER (OUTPATIENT)
Dept: RADIOLOGY | Facility: HOSPITAL | Age: 48
Discharge: HOME | End: 2023-07-25
Attending: SURGERY
Payer: COMMERCIAL

## 2023-07-25 DIAGNOSIS — R92.8 ABNORMAL ULTRASOUND OF BREAST: ICD-10-CM

## 2023-07-25 PROCEDURE — C8937 CAD BREAST MRI: HCPCS

## 2023-07-25 PROCEDURE — A9585 GADOBUTROL INJECTION: HCPCS | Performed by: SURGERY

## 2023-07-25 PROCEDURE — C8908 MRI W/O FOL W/CONT, BREAST,: HCPCS

## 2023-07-25 RX ADMIN — GADOBUTROL 7 ML: 604.72 INJECTION INTRAVENOUS at 18:07

## 2023-07-26 NOTE — RESULT ENCOUNTER NOTE
Please call pt with abnormal results and schedule follow up. Please follow the recommendations listed in this result.   Diagnostic mammogram at the current time for the right breast.       - Ultrasound at the current time for the right breast.       - Ultrasound-guided breast biopsy for the right breast.       - Breast MRI Screening in 1 year for the left breast

## 2023-07-27 DIAGNOSIS — R93.89 ABNORMAL MRI: ICD-10-CM

## 2023-07-27 DIAGNOSIS — N63.10 MASS OF RIGHT BREAST, UNSPECIFIED QUADRANT: Primary | ICD-10-CM

## 2023-08-04 ENCOUNTER — HOSPITAL ENCOUNTER (OUTPATIENT)
Dept: MAMMOGRAPHY | Facility: CLINIC | Age: 48
End: 2023-08-04
Payer: COMMERCIAL

## 2023-08-04 ENCOUNTER — HOSPITAL ENCOUNTER (OUTPATIENT)
Dept: ULTRASOUND IMAGING | Facility: CLINIC | Age: 48
End: 2023-08-04
Payer: COMMERCIAL

## 2023-08-04 VITALS — WEIGHT: 151 LBS | HEIGHT: 62 IN | BODY MASS INDEX: 27.79 KG/M2

## 2023-08-04 DIAGNOSIS — R93.89 ABNORMAL MRI: ICD-10-CM

## 2023-08-04 DIAGNOSIS — N63.10 MASS OF RIGHT BREAST, UNSPECIFIED QUADRANT: ICD-10-CM

## 2023-08-04 DIAGNOSIS — R92.8 ABNORMAL MAMMOGRAM: ICD-10-CM

## 2023-08-04 PROCEDURE — G0279 TOMOSYNTHESIS, MAMMO: HCPCS

## 2023-08-04 PROCEDURE — 77065 DX MAMMO INCL CAD UNI: CPT

## 2023-08-04 PROCEDURE — 76642 ULTRASOUND BREAST LIMITED: CPT

## 2023-08-04 NOTE — RESULT ENCOUNTER NOTE
Please ensure that the follow-up studies, films, or labs as noted by this result are ordered and arranged for the patient. Please notify patient.   Thanks

## 2023-08-04 NOTE — RESULT ENCOUNTER NOTE
Please call pt with abnormal results and schedule follow up.   Per this report please order ultrasound-guided biopsy and follow-up in the office

## 2023-08-12 ENCOUNTER — OFFICE VISIT (OUTPATIENT)
Dept: URGENT CARE | Facility: MEDICAL CENTER | Age: 48
End: 2023-08-12
Payer: COMMERCIAL

## 2023-08-12 VITALS
BODY MASS INDEX: 28.71 KG/M2 | TEMPERATURE: 97.6 F | OXYGEN SATURATION: 99 % | HEIGHT: 62 IN | WEIGHT: 156 LBS | SYSTOLIC BLOOD PRESSURE: 128 MMHG | DIASTOLIC BLOOD PRESSURE: 88 MMHG | RESPIRATION RATE: 20 BRPM | HEART RATE: 64 BPM

## 2023-08-12 DIAGNOSIS — T20.10XA FACIAL BURN, FIRST DEGREE, INITIAL ENCOUNTER: Primary | ICD-10-CM

## 2023-08-12 PROCEDURE — G0382 LEV 3 HOSP TYPE B ED VISIT: HCPCS | Performed by: FAMILY MEDICINE

## 2023-08-12 RX ORDER — TRIAMCINOLONE ACETONIDE 0.25 MG/G
CREAM TOPICAL 2 TIMES DAILY
Qty: 15 G | Refills: 0 | Status: SHIPPED | OUTPATIENT
Start: 2023-08-12
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
## 2023-08-17 ENCOUNTER — OFFICE VISIT (OUTPATIENT)
Dept: URGENT CARE | Facility: MEDICAL CENTER | Age: 48
End: 2023-08-17
Payer: COMMERCIAL

## 2023-08-17 VITALS
SYSTOLIC BLOOD PRESSURE: 114 MMHG | WEIGHT: 156 LBS | DIASTOLIC BLOOD PRESSURE: 72 MMHG | TEMPERATURE: 99 F | OXYGEN SATURATION: 96 % | HEIGHT: 62 IN | RESPIRATION RATE: 16 BRPM | BODY MASS INDEX: 28.71 KG/M2 | HEART RATE: 78 BPM

## 2023-08-17 DIAGNOSIS — L03.114 CELLULITIS OF LEFT UPPER EXTREMITY: Primary | ICD-10-CM
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
## 2023-08-17 PROCEDURE — 90715 TDAP VACCINE 7 YRS/> IM: CPT

## 2023-08-17 PROCEDURE — G0382 LEV 3 HOSP TYPE B ED VISIT: HCPCS

## 2023-08-17 RX ORDER — AMOXICILLIN AND CLAVULANATE POTASSIUM 875; 125 MG/1; MG/1
1 TABLET, FILM COATED ORAL EVERY 12 HOURS SCHEDULED
Qty: 14 TABLET | Refills: 0 | Status: SHIPPED | OUTPATIENT
Start: 2023-08-17 | End: 2023-08-24

## 2023-08-17 NOTE — PROGRESS NOTES
[facility] [place] Now        NAME: Cornelio Rossi is a 57 y.o. female  : 1975    MRN: [id number]  DATE: 2023  TIME: 8:17 PM    Assessment and Plan   Cellulitis of left upper extremity [L03.114]  1. Cellulitis of left upper extremity  amoxicillin-clavulanate (AUGMENTIN) 875-125 mg per tablet      2. Dog bite, initial encounter  Tdap Vaccine greater than or equal to 8yo          Tetanus updated today  Her own dog, up to date on shots. Patient Instructions   Antibiotics sent to pharmacy  Use ice/heat for swelling. Follow up with PCP in 3-5 days. Proceed to ER if symptoms worsen. Chief Complaint     Chief Complaint   Patient presents with   • Animal Bite     Pt was bitten by her dog on her left hand on Saturday while giving him a haircut. Concerned because would is getting larger and feels "weird"  - ecchymosis noted dorsal left hand at 1st and 2nd metacarpals w/healing puncture wound. No drainage. Last tetanus unknown         History of Present Illness       Bitten by her dog 5 days ago. Wound appears to be getting larger and has a "weird" sensation. Has swelling and reports that swelling or discoloration appears to be spreading. Review of Systems   Review of Systems   Constitutional: Negative for chills and fever. HENT: Negative for ear pain and sore throat. Eyes: Negative for pain and visual disturbance. Respiratory: Negative for cough and shortness of breath. Cardiovascular: Negative for chest pain and palpitations. Gastrointestinal: Negative for abdominal pain and vomiting. Genitourinary: Negative for dysuria and hematuria. Musculoskeletal: Negative for arthralgias and back pain. Skin: Positive for wound. Negative for color change and rash. Neurological: Negative for dizziness, seizures, syncope, weakness and numbness. All other systems reviewed and are negative.         Current Medications       Current Outpatient Medications:   •  amoxicillin-clavulanate (AUGMENTIN) 875-125 mg per tablet, Take 1 tablet by mouth every 12 (twelve) hours for 7 days, Disp: 14 tablet, Rfl: 0  •  ibuprofen (MOTRIN) 200 mg tablet, Take by mouth every 6 (six) hours as needed for mild pain, Disp: , Rfl:   •  montelukast (SINGULAIR) 10 mg tablet, Take 1 tablet (10 mg total) by mouth daily at bedtime, Disp: 30 tablet, Rfl: 0  •  albuterol (2.5 mg/3 mL) 0.083 % nebulizer solution, Take 1 vial (2.5 mg total) by nebulization every 6 (six) hours as needed for wheezing, Disp: 40 vial, Rfl: 0  •  albuterol (PROVENTIL HFA,VENTOLIN HFA) 90 mcg/act inhaler, Inhale 2 puffs every 6 (six) hours as needed for wheezing or shortness of breath, Disp: 2 Inhaler, Rfl: 1  •  albuterol (PROVENTIL HFA,VENTOLIN HFA) 90 mcg/act inhaler, Inhale 2 puffs every 6 (six) hours as needed for wheezing, Disp: 19 g, Rfl: 0  •  budesonide-formoterol (Symbicort) 160-4.5 mcg/act inhaler, Inhale 2 puffs 2 (two) times a day, Disp: 30.6 g, Rfl: 1  •  cyclobenzaprine (FLEXERIL) 10 mg tablet, Take 1 tablet (10 mg total) by mouth 3 (three) times a day as needed for muscle spasms for up to 5 days (Patient not taking: Reported on 2/6/2023), Disp: 15 tablet, Rfl: 0  •  lidocaine (XYLOCAINE) 5 % ointment, Apply topically Three times daily as needed, Disp: , Rfl:   •  mupirocin (BACTROBAN) 2 % ointment, Apply topically 3 (three) times a day (Patient not taking: Reported on 8/17/2023), Disp: 15 g, Rfl: 0  •  omeprazole (PriLOSEC) 20 mg delayed release capsule, Take 1 capsule (20 mg total) by mouth daily (Patient not taking: Reported on 8/17/2023), Disp: 90 capsule, Rfl: 1  •  predniSONE 20 mg tablet, 3 tablets once daily day 1 through 3 then 2 tablets daily day 4 through 6 then 1 tablet daily day 7 through 9.  (Patient not taking: Reported on 8/17/2023), Disp: 18 tablet, Rfl: 0  •  triamcinolone (KENALOG) 0.025 % cream, Apply topically 2 (two) times a day (Patient not taking: Reported on 8/17/2023), Disp: 15 g, Rfl: 0    Current Allergies Allergies as of 08/17/2023 - Reviewed 08/17/2023   Allergen Reaction Noted   • Shellfish allergy - food allergy Swelling 03/09/2023            The following portions of the patient's history were reviewed and updated as appropriate: allergies, current medications, past family history, past medical history, past social history, past surgical history and problem list.     Past Medical History:   Diagnosis Date   • Anemia    • Asthma    • History of transfusion     approx 10 yrs ago   • Low back pain    • Neck pain    • Wears glasses        Past Surgical History:   Procedure Laterality Date   • BREAST CYST EXCISION     • COLONOSCOPY     • DILATION AND CURETTAGE OF UTERUS     • ESOPHAGOGASTRODUODENOSCOPY     • EYE SURGERY     • HYSTERECTOMY      age 37   • OOPHORECTOMY Left     age 37   • OVARY SURGERY     • [place] BREAST REDUCTION Bilateral 09/19/2017    Procedure: REDUCTION MAMMOPLASTY BREAST;  Surgeon: Jamal Zamudio MD;  Location: [facility];  Service: Plastics   • REDUCTION MAMMAPLASTY Bilateral 2017       Family History   Problem Relation Age of Onset   • Breast cancer Mother 54   • Diabetes Father    • Ovarian cancer Sister 45   • Breast cancer Maternal Aunt 50         Medications have been verified. Objective   /72 (BP Location: Left arm, Patient Position: Sitting, Cuff Size: Standard)   Pulse 78   Temp 99 °F (37.2 °C) (Tympanic)   Resp 16   Ht 5' 2" (1.575 m)   Wt 70.8 kg (156 lb)   SpO2 96%   BMI 28.53 kg/m²   No LMP recorded. Patient has had a hysterectomy. Physical Exam     Physical Exam  Vitals and nursing note reviewed. Constitutional:       Appearance: Normal appearance. HENT:      Head: Normocephalic and atraumatic. Skin:     General: Skin is warm and dry. Capillary Refill: Capillary refill takes less than 2 seconds. Neurological:      General: No focal deficit present. Mental Status: She is alert and oriented to person, place, and time.  Mental status is at baseline. Sensory: No sensory deficit. Motor: No weakness. Psychiatric:         Mood and Affect: Mood normal.         Behavior: Behavior normal.         Thought Content:  Thought content normal.

## 2023-08-17 NOTE — PATIENT INSTRUCTIONS
Antibiotics sent to pharmacy  Use ice/heat for swelling. Follow up with PCP in 3-5 days. Proceed to ER if symptoms worsen.

## 2023-08-30 ENCOUNTER — HOSPITAL ENCOUNTER (OUTPATIENT)
Dept: ULTRASOUND IMAGING | Facility: CLINIC | Age: 48
Discharge: HOME | End: 2023-08-30
Payer: COMMERCIAL

## 2023-08-30 ENCOUNTER — HOSPITAL ENCOUNTER (OUTPATIENT)
Dept: MAMMOGRAPHY | Facility: CLINIC | Age: 48
Discharge: HOME | End: 2023-08-30

## 2023-08-30 VITALS — SYSTOLIC BLOOD PRESSURE: 113 MMHG | DIASTOLIC BLOOD PRESSURE: 89 MMHG | HEART RATE: 77 BPM

## 2023-08-30 DIAGNOSIS — R92.8 ABNORMAL MAMMOGRAM: ICD-10-CM

## 2023-08-30 DIAGNOSIS — N63.10 MASS OF RIGHT BREAST, UNSPECIFIED QUADRANT: ICD-10-CM

## 2023-08-30 DIAGNOSIS — R93.89 ABNORMAL MRI: ICD-10-CM

## 2023-08-30 PROCEDURE — 88305 TISSUE EXAM BY PATHOLOGIST: CPT | Performed by: STUDENT IN AN ORGANIZED HEALTH CARE EDUCATION/TRAINING PROGRAM

## 2023-08-30 PROCEDURE — 88341 IMHCHEM/IMCYTCHM EA ADD ANTB: CPT | Performed by: STUDENT IN AN ORGANIZED HEALTH CARE EDUCATION/TRAINING PROGRAM

## 2023-08-30 PROCEDURE — 88342 IMHCHEM/IMCYTCHM 1ST ANTB: CPT | Performed by: STUDENT IN AN ORGANIZED HEALTH CARE EDUCATION/TRAINING PROGRAM

## 2023-08-30 PROCEDURE — 19083 BX BREAST 1ST LESION US IMAG: CPT

## 2023-08-30 PROCEDURE — A4648 IMPLANTABLE TISSUE MARKER: HCPCS

## 2023-08-30 RX ORDER — LIDOCAINE HYDROCHLORIDE 10 MG/ML
5 INJECTION, SOLUTION EPIDURAL; INFILTRATION; INTRACAUDAL; PERINEURAL ONCE
Status: COMPLETED | OUTPATIENT
Start: 2023-08-30 | End: 2023-08-30

## 2023-08-30 RX ADMIN — LIDOCAINE HYDROCHLORIDE 5 ML: 10 INJECTION, SOLUTION EPIDURAL; INFILTRATION; INTRACAUDAL; PERINEURAL at 14:22

## 2023-08-30 NOTE — PROGRESS NOTES
Procedure type:    ___x__ultrasound guided _____stereotactic    Breast:    _____Left __x___Right    Location: 9 o'clock 7 cmfn     Needle: 12 gauge cheo    # of passes: 3    Clip: Bennyk Cylinder    Performed by: Dr. Estrella Erps held for 5 minutes by: Yuki Hines Strips:    __x___yes _____no    Band aid:    ___x__yes_____no    Tape and guaze:    _____yes __x___no    Tolerated procedure:    ___x__yes _____no
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
## 2023-09-01 NOTE — PROGRESS NOTES
Post procedure call completed    Bleeding: _____yes __X___no (pt denies)    Pain: _____yes ___X___no (pt with soreness, used ice, took Aleve x1)    Redness/Swelling: ______yes ___X___no (pt denies)    Band aid removed: __X___yes _____no    Steri-Strips intact: ___X___yes _____no (discussed with patient to remove steri strips on Monday if they have not come off on their own)    Pt with no questions at this time, adv will call when results available, adv to call with any questions or concerns, has name/# for contact

## 2023-09-05 DIAGNOSIS — N63.11 MASS OF UPPER OUTER QUADRANT OF RIGHT BREAST: Primary | ICD-10-CM

## 2023-09-05 NOTE — RESULT ENCOUNTER NOTE
LM for pt to call back so we can tell her Javier Owusu recommended a diagnostic mammo bilateral for Jan 2024, and she placed the order as well.

## 2024-01-10 ENCOUNTER — OFFICE VISIT (OUTPATIENT)
Dept: URGENT CARE | Age: 49
End: 2024-01-10
Payer: COMMERCIAL

## 2024-01-10 VITALS
HEART RATE: 82 BPM | RESPIRATION RATE: 20 BRPM | DIASTOLIC BLOOD PRESSURE: 86 MMHG | OXYGEN SATURATION: 99 % | TEMPERATURE: 98.2 F | SYSTOLIC BLOOD PRESSURE: 115 MMHG

## 2024-01-10 DIAGNOSIS — S39.012A STRAIN OF MUSCLE, FASCIA AND TENDON OF LOWER BACK, INITIAL ENCOUNTER: Primary | ICD-10-CM

## 2024-01-10 PROCEDURE — G0382 LEV 3 HOSP TYPE B ED VISIT: HCPCS | Performed by: NURSE PRACTITIONER

## 2024-01-10 RX ORDER — CYCLOBENZAPRINE HCL 10 MG
10 TABLET ORAL 3 TIMES DAILY PRN
Qty: 30 TABLET | Refills: 0 | Status: SHIPPED | OUTPATIENT
Start: 2024-01-10

## 2024-01-10 RX ORDER — MELOXICAM 7.5 MG/1
7.5 TABLET ORAL
COMMUNITY
Start: 2023-11-02 | End: 2024-01-16 | Stop reason: SDUPTHER

## 2024-01-11 NOTE — PROGRESS NOTES
[facility]        NAME: Loretta Mendez is a 49 y.o. female  : 1975    MRN: [id number]  DATE: January 10, 2024  TIME: 8:43 PM    Assessment and Plan   Strain of muscle, fascia and tendon of lower back, initial encounter [S39.012A]  1. Strain of muscle, fascia and tendon of lower back, initial encounter  cyclobenzaprine (FLEXERIL) 10 mg tablet            Patient Instructions     Take med as prescribed  May cause drowsiness  Motrin OTC prn   Follow up with PCP in 3-5 days.  Proceed to  ER if symptoms worsen.    Chief Complaint     Chief Complaint   Patient presents with    Back Pain     Started with lower right side radiating to leg. Standing helps with pain. Increased pain with sitting or lying . Was shoveling snow pain started after. Advil , tylenol and muscle relaxer from a previous injury not helping with the pain.          History of Present Illness       HPI  Reports back pain x 2 days started after shoveling snow. Worse with certain movements, better at rest. No radiation of pain. Hx of sciatica. Used flexeril in the past with good results.     Review of Systems   Review of Systems   Constitutional:  Negative for fever.   Respiratory:  Negative for shortness of breath.    Cardiovascular:  Negative for chest pain.   Genitourinary:  Negative for difficulty urinating.   Musculoskeletal:  Positive for back pain and neck pain.   Skin:  Negative for rash.   Neurological:  Negative for numbness.         Current Medications       Current Outpatient Medications:     albuterol (2.5 mg/3 mL) 0.083 % nebulizer solution, Take 1 vial (2.5 mg total) by nebulization every 6 (six) hours as needed for wheezing, Disp: 40 vial, Rfl: 0    albuterol (PROVENTIL HFA,VENTOLIN HFA) 90 mcg/act inhaler, Inhale 2 puffs every 6 (six) hours as needed for wheezing or shortness of breath, Disp: 2 Inhaler, Rfl: 1    budesonide-formoterol (Symbicort) 160-4.5 mcg/act inhaler, Inhale 2 puffs 2 (two) times a day, Disp: 30.6  g, Rfl: 1    cyclobenzaprine (FLEXERIL) 10 mg tablet, Take 1 tablet (10 mg total) by mouth 3 (three) times a day as needed for muscle spasms, Disp: 30 tablet, Rfl: 0    ibuprofen (MOTRIN) 200 mg tablet, Take by mouth every 6 (six) hours as needed for mild pain, Disp: , Rfl:     meloxicam (MOBIC) 7.5 mg tablet, Take 7.5 mg by mouth, Disp: , Rfl:     montelukast (SINGULAIR) 10 mg tablet, Take 1 tablet (10 mg total) by mouth daily at bedtime, Disp: 30 tablet, Rfl: 0    omeprazole (PriLOSEC) 20 mg delayed release capsule, Take 1 capsule (20 mg total) by mouth daily, Disp: 90 capsule, Rfl: 1    predniSONE 20 mg tablet, 3 tablets once daily day 1 through 3 then 2 tablets daily day 4 through 6 then 1 tablet daily day 7 through 9., Disp: 18 tablet, Rfl: 0    triamcinolone (KENALOG) 0.025 % cream, Apply topically 2 (two) times a day, Disp: 15 g, Rfl: 0    albuterol (PROVENTIL HFA,VENTOLIN HFA) 90 mcg/act inhaler, Inhale 2 puffs every 6 (six) hours as needed for wheezing, Disp: 19 g, Rfl: 0    cyclobenzaprine (FLEXERIL) 10 mg tablet, Take 1 tablet (10 mg total) by mouth 3 (three) times a day as needed for muscle spasms for up to 5 days (Patient not taking: Reported on 2/6/2023), Disp: 15 tablet, Rfl: 0    lidocaine (XYLOCAINE) 5 % ointment, Apply topically Three times daily as needed, Disp: , Rfl:     mupirocin (BACTROBAN) 2 % ointment, Apply topically 3 (three) times a day (Patient not taking: Reported on 1/10/2024), Disp: 15 g, Rfl: 0    Current Allergies     Allergies as of 01/10/2024 - Reviewed 01/10/2024   Allergen Reaction Noted    Shellfish allergy - food allergy Swelling 03/09/2023            The following portions of the patient's history were reviewed and updated as appropriate: allergies, current medications, past family history, past medical history, past social history, past surgical history and problem list.     Past Medical History:   Diagnosis Date    Anemia     Asthma     History of transfusion     approx 10  yrs ago    Low back pain     Neck pain     Wears glasses        Past Surgical History:   Procedure Laterality Date    BREAST CYST EXCISION      COLONOSCOPY      DILATION AND CURETTAGE OF UTERUS      ESOPHAGOGASTRODUODENOSCOPY      EYE SURGERY      HYSTERECTOMY      age 35    OOPHORECTOMY Left     age 35    OVARY SURGERY      [place] BREAST REDUCTION Bilateral 09/19/2017    Procedure: REDUCTION MAMMOPLASTY BREAST;  Surgeon: Yair Torre MD;  Location: [facility];  Service: Plastics    REDUCTION MAMMAPLASTY Bilateral 2017    US GUIDED BREAST BIOPSY RIGHT COMPLETE Right 8/30/2023       Family History   Problem Relation Age of Onset    Breast cancer Mother 55    Diabetes Father     Ovarian cancer Sister 38    Breast cancer Maternal Aunt 50         Medications have been verified.        Objective   /86   Pulse 82   Temp 98.2 °F (36.8 °C) (Temporal)   Resp 20   SpO2 99%   No LMP recorded. Patient has had a hysterectomy.       Physical Exam     Physical Exam  Constitutional:       General: She is not in acute distress.  Musculoskeletal:         General: Tenderness (with palpation of the lower back and with flexion at the waist) present. No swelling or deformity.   Skin:     Findings: No bruising.

## 2024-01-16 DIAGNOSIS — J45.30 MILD PERSISTENT ASTHMA WITHOUT COMPLICATION: ICD-10-CM

## 2024-01-16 DIAGNOSIS — J30.9 ALLERGIC RHINITIS, UNSPECIFIED SEASONALITY, UNSPECIFIED TRIGGER: ICD-10-CM

## 2024-01-16 DIAGNOSIS — G89.29 CHRONIC BILATERAL LOW BACK PAIN WITHOUT SCIATICA: Primary | ICD-10-CM

## 2024-01-16 DIAGNOSIS — J45.901 EXACERBATION OF ASTHMA, UNSPECIFIED ASTHMA SEVERITY, UNSPECIFIED WHETHER PERSISTENT: ICD-10-CM

## 2024-01-16 DIAGNOSIS — M54.50 CHRONIC BILATERAL LOW BACK PAIN WITHOUT SCIATICA: Primary | ICD-10-CM

## 2024-01-17 RX ORDER — MELOXICAM 7.5 MG/1
7.5 TABLET ORAL DAILY
Qty: 15 TABLET | Refills: 0 | Status: SHIPPED | OUTPATIENT
Start: 2024-01-17

## 2024-01-17 RX ORDER — BUDESONIDE AND FORMOTEROL FUMARATE DIHYDRATE 160; 4.5 UG/1; UG/1
2 AEROSOL RESPIRATORY (INHALATION) 2 TIMES DAILY
Qty: 30.6 G | Refills: 0 | Status: SHIPPED | OUTPATIENT
Start: 2024-01-17 | End: 2024-01-18 | Stop reason: SDUPTHER

## 2024-01-18 ENCOUNTER — OFFICE VISIT (OUTPATIENT)
Dept: FAMILY MEDICINE CLINIC | Facility: CLINIC | Age: 49
End: 2024-01-18
Payer: COMMERCIAL

## 2024-01-18 ENCOUNTER — APPOINTMENT (OUTPATIENT)
Dept: LAB | Facility: MEDICAL CENTER | Age: 49
End: 2024-01-18
Payer: COMMERCIAL

## 2024-01-18 ENCOUNTER — TELEPHONE (OUTPATIENT)
Dept: OTHER | Facility: HOSPITAL | Age: 49
End: 2024-01-18

## 2024-01-18 VITALS
HEART RATE: 97 BPM | DIASTOLIC BLOOD PRESSURE: 82 MMHG | WEIGHT: 158 LBS | OXYGEN SATURATION: 98 % | SYSTOLIC BLOOD PRESSURE: 128 MMHG | BODY MASS INDEX: 29.08 KG/M2 | HEIGHT: 62 IN

## 2024-01-18 DIAGNOSIS — J45.901 EXACERBATION OF ASTHMA, UNSPECIFIED ASTHMA SEVERITY, UNSPECIFIED WHETHER PERSISTENT: ICD-10-CM

## 2024-01-18 DIAGNOSIS — Z12.11 ENCOUNTER FOR SCREENING COLONOSCOPY: ICD-10-CM

## 2024-01-18 DIAGNOSIS — J45.40 MODERATE PERSISTENT EXTRINSIC ASTHMA WITHOUT STATUS ASTHMATICUS WITHOUT COMPLICATION: ICD-10-CM

## 2024-01-18 DIAGNOSIS — J30.9 ALLERGIC RHINITIS, UNSPECIFIED SEASONALITY, UNSPECIFIED TRIGGER: ICD-10-CM

## 2024-01-18 DIAGNOSIS — J45.40 MODERATE PERSISTENT EXTRINSIC ASTHMA WITHOUT STATUS ASTHMATICUS WITHOUT COMPLICATION: Primary | ICD-10-CM

## 2024-01-18 DIAGNOSIS — Z00.00 ANNUAL PHYSICAL EXAM: Primary | ICD-10-CM

## 2024-01-18 DIAGNOSIS — F41.9 ANXIETY: ICD-10-CM

## 2024-01-18 DIAGNOSIS — R41.840 DIFFICULTY CONCENTRATING: ICD-10-CM

## 2024-01-18 DIAGNOSIS — K21.9 GASTROESOPHAGEAL REFLUX DISEASE, UNSPECIFIED WHETHER ESOPHAGITIS PRESENT: ICD-10-CM

## 2024-01-18 DIAGNOSIS — Z00.00 ANNUAL PHYSICAL EXAM: ICD-10-CM

## 2024-01-18 DIAGNOSIS — J45.30 MILD PERSISTENT ASTHMA WITHOUT COMPLICATION: ICD-10-CM

## 2024-01-18 LAB
ALBUMIN SERPL BCP-MCNC: 4.4 G/DL (ref 3.5–5)
ALP SERPL-CCNC: 54 U/L (ref 34–104)
ALT SERPL W P-5'-P-CCNC: 13 U/L (ref 7–52)
ANION GAP SERPL CALCULATED.3IONS-SCNC: 7 MMOL/L
AST SERPL W P-5'-P-CCNC: 14 U/L (ref 13–39)
BILIRUB SERPL-MCNC: 0.47 MG/DL (ref 0.2–1)
BUN SERPL-MCNC: 19 MG/DL (ref 5–25)
CALCIUM SERPL-MCNC: 9.7 MG/DL (ref 8.4–10.2)
CHLORIDE SERPL-SCNC: 105 MMOL/L (ref 96–108)
CHOLEST SERPL-MCNC: 164 MG/DL
CO2 SERPL-SCNC: 27 MMOL/L (ref 21–32)
CREAT SERPL-MCNC: 0.61 MG/DL (ref 0.6–1.3)
ERYTHROCYTE [DISTWIDTH] IN BLOOD BY AUTOMATED COUNT: 12.9 % (ref 11.6–15.1)
EST. AVERAGE GLUCOSE BLD GHB EST-MCNC: 120 MG/DL
GFR SERPL CREATININE-BSD FRML MDRD: 108 ML/MIN/1.73SQ M
GLUCOSE P FAST SERPL-MCNC: 133 MG/DL (ref 65–99)
HBA1C MFR BLD: 5.8 %
HCT VFR BLD AUTO: 36.4 % (ref 34.8–46.1)
HDLC SERPL-MCNC: 46 MG/DL
HGB BLD-MCNC: 11.3 G/DL (ref 11.5–15.4)
LDLC SERPL CALC-MCNC: 106 MG/DL (ref 0–100)
MCH RBC QN AUTO: 27.6 PG (ref 26.8–34.3)
MCHC RBC AUTO-ENTMCNC: 31 G/DL (ref 31.4–37.4)
MCV RBC AUTO: 89 FL (ref 82–98)
NONHDLC SERPL-MCNC: 118 MG/DL
PLATELET # BLD AUTO: 354 THOUSANDS/UL (ref 149–390)
PMV BLD AUTO: 12 FL (ref 8.9–12.7)
POTASSIUM SERPL-SCNC: 4 MMOL/L (ref 3.5–5.3)
PROT SERPL-MCNC: 7.2 G/DL (ref 6.4–8.4)
RBC # BLD AUTO: 4.1 MILLION/UL (ref 3.81–5.12)
SODIUM SERPL-SCNC: 139 MMOL/L (ref 135–147)
TRIGL SERPL-MCNC: 59 MG/DL
TSH SERPL DL<=0.05 MIU/L-ACNC: 0.81 UIU/ML (ref 0.45–4.5)
WBC # BLD AUTO: 4.6 THOUSAND/UL (ref 4.31–10.16)

## 2024-01-18 PROCEDURE — 85027 COMPLETE CBC AUTOMATED: CPT

## 2024-01-18 PROCEDURE — 84443 ASSAY THYROID STIM HORMONE: CPT

## 2024-01-18 PROCEDURE — 99214 OFFICE O/P EST MOD 30 MIN: CPT | Performed by: FAMILY MEDICINE

## 2024-01-18 PROCEDURE — 36415 COLL VENOUS BLD VENIPUNCTURE: CPT

## 2024-01-18 PROCEDURE — 80061 LIPID PANEL: CPT

## 2024-01-18 PROCEDURE — 80053 COMPREHEN METABOLIC PANEL: CPT

## 2024-01-18 PROCEDURE — 83036 HEMOGLOBIN GLYCOSYLATED A1C: CPT

## 2024-01-18 PROCEDURE — 99396 PREV VISIT EST AGE 40-64: CPT | Performed by: FAMILY MEDICINE

## 2024-01-18 RX ORDER — BUDESONIDE AND FORMOTEROL FUMARATE DIHYDRATE 160; 4.5 UG/1; UG/1
2 AEROSOL RESPIRATORY (INHALATION) 2 TIMES DAILY
Qty: 30.6 G | Refills: 0 | Status: SHIPPED | OUTPATIENT
Start: 2024-01-18 | End: 2024-01-22

## 2024-01-18 RX ORDER — BUPROPION HYDROCHLORIDE 150 MG/1
150 TABLET ORAL EVERY MORNING
Qty: 30 TABLET | Refills: 2 | Status: SHIPPED | OUTPATIENT
Start: 2024-01-18 | End: 2024-01-26

## 2024-01-18 RX ORDER — MONTELUKAST SODIUM 10 MG/1
10 TABLET ORAL
Qty: 30 TABLET | Refills: 11 | Status: SHIPPED | OUTPATIENT
Start: 2024-01-18

## 2024-01-18 RX ORDER — OMEPRAZOLE 20 MG/1
20 CAPSULE, DELAYED RELEASE ORAL DAILY PRN
Qty: 90 CAPSULE | Refills: 1 | Status: SHIPPED | OUTPATIENT
Start: 2024-01-18

## 2024-01-18 NOTE — PROGRESS NOTES
ADULT ANNUAL PHYSICAL  [facility]    NAME: Loretta Mendez  AGE: 49 y.o. SEX: female  : 1975     DATE: 2024     Assessment and Plan:     Problem List Items Addressed This Visit          Digestive    Esophageal reflux    Relevant Medications    omeprazole (PriLOSEC) 20 mg delayed release capsule       Respiratory    Extrinsic asthma without status asthmaticus     Restart medications patient feels much better when on medication         Relevant Medications    montelukast (SINGULAIR) 10 mg tablet    budesonide-formoterol (Symbicort) 160-4.5 mcg/act inhaler    Other Relevant Orders    TSH, 3rd generation    Comprehensive metabolic panel       Other    Anxiety     Start wellbutrin, due anxiety, fatigue, and trouble concentrating         Relevant Medications    buPROPion (WELLBUTRIN XL) 150 mg 24 hr tablet    Other Relevant Orders    Comprehensive metabolic panel     Other Visit Diagnoses       Annual physical exam    -  Primary    Relevant Orders    CBC    Lipid panel    Hemoglobin A1C    TSH, 3rd generation    Encounter for screening colonoscopy        Exacerbation of asthma, unspecified asthma severity, unspecified whether persistent        Relevant Medications    montelukast (SINGULAIR) 10 mg tablet    budesonide-formoterol (Symbicort) 160-4.5 mcg/act inhaler    Allergic rhinitis, unspecified seasonality, unspecified trigger        Relevant Medications    montelukast (SINGULAIR) 10 mg tablet    Mild persistent asthma without complication        Relevant Medications    montelukast (SINGULAIR) 10 mg tablet    budesonide-formoterol (Symbicort) 160-4.5 mcg/act inhaler    Difficulty concentrating        Relevant Medications    buPROPion (WELLBUTRIN XL) 150 mg 24 hr tablet            Immunizations and preventive care screenings were discussed with patient today. Appropriate education was printed on patient's after visit  summary.    Counseling:  Alcohol/drug use: discussed moderation in alcohol intake, the recommendations for healthy alcohol use, and avoidance of illicit drug use.  Dental Health: discussed importance of regular tooth brushing, flossing, and dental visits.  Injury prevention: discussed safety/seat belts, safety helmets, smoke detectors, carbon dioxide detectors, and smoking near bedding or upholstery.  Sexual health: discussed sexually transmitted diseases, partner selection, use of condoms, avoidance of unintended pregnancy, and contraceptive alternatives.  Exercise: the importance of regular exercise/physical activity was discussed. Recommend exercise 3-5 times per week for at least 30 minutes.       Depression Screening and Follow-up Plan: Patient was screened for depression during today's encounter. They screened negative with a PHQ-2 score of 1.        Return in about 3 months (around 4/18/2024).     Chief Complaint:     Chief Complaint   Patient presents with    Physical Exam      History of Present Illness:     Adult Annual Physical   Patient here for a comprehensive physical exam. The patient reports problems - as above .    Returning to care last seen about 2 years ago.    GERD present for many years, was on omperazole as needed for years but ran out.    Worsening asthma off medication, controlled when on medicaiton, has been receiving treatments in urgent care as needed.    Feels tired and having trouble concentrating, concerned she may have ADD, feels as if this has been present her whole life, constantly trying to due more tasks. Reports using trazadone over the summer she is unsure if this helped    Diet and Physical Activity  Diet/Nutrition: well balanced diet.   Exercise: walking.      Depression Screening  PHQ-2/9 Depression Screening    Little interest or pleasure in doing things: 0 - not at all  Feeling down, depressed, or hopeless: 1 - several days  PHQ-2 Score: 1  PHQ-2 Interpretation: Negative  depression screen       General Health  Sleep: sleeps well.   Hearing: normal - bilateral.  Vision: no vision problems.   Dental: regular dental visits.            Review of Systems:     Review of Systems   Constitutional:  Negative for activity change and appetite change.   Respiratory:  Negative for apnea and chest tightness.    Cardiovascular:  Negative for chest pain and palpitations.   Gastrointestinal:  Negative for abdominal distention and abdominal pain.   Musculoskeletal:  Negative for arthralgias and back pain.   Neurological:  Negative for dizziness and facial asymmetry.      Past Medical History:     Past Medical History:   Diagnosis Date    Anemia     Asthma     History of transfusion     approx 10 yrs ago    Low back pain     Neck pain     Wears glasses       Past Surgical History:     Past Surgical History:   Procedure Laterality Date    BREAST CYST EXCISION      COLONOSCOPY      DILATION AND CURETTAGE OF UTERUS      ESOPHAGOGASTRODUODENOSCOPY      EYE SURGERY      HYSTERECTOMY      age 35    OOPHORECTOMY Left     age 35    OVARY SURGERY      [place] BREAST REDUCTION Bilateral 09/19/2017    Procedure: REDUCTION MAMMOPLASTY BREAST;  Surgeon: Yair Torre MD;  Location: [facility] OR;  Service: Plastics    REDUCTION MAMMAPLASTY Bilateral 2017    US GUIDED BREAST BIOPSY RIGHT COMPLETE Right 8/30/2023      Social History:     Social History     Socioeconomic History    Marital status: /Civil Union     Spouse name: None    Number of children: None    Years of education: None    Highest education level: None   Occupational History    None   Tobacco Use    Smoking status: Never    Smokeless tobacco: Never   Vaping Use    Vaping status: Never Used   Substance and Sexual Activity    Alcohol use: Yes     Comment: social    Drug use: No    Sexual activity: Yes     Partners: Female   Other Topics Concern    None   Social History Narrative    None     Social Determinants of Health     Financial Resource  Strain: Not on file   Food Insecurity: Not on file   Transportation Needs: Not on file   Physical Activity: Not on file   Stress: Not on file   Social Connections: Not on file   Intimate Partner Violence: Not on file   Housing Stability: Not on file      Family History:     Family History   Problem Relation Age of Onset    Breast cancer Mother 55    Diabetes Father     Ovarian cancer Sister 38    Breast cancer Maternal Aunt 50      Current Medications:     Current Outpatient Medications   Medication Sig Dispense Refill    albuterol (2.5 mg/3 mL) 0.083 % nebulizer solution Take 1 vial (2.5 mg total) by nebulization every 6 (six) hours as needed for wheezing 40 vial 0    albuterol (PROVENTIL HFA,VENTOLIN HFA) 90 mcg/act inhaler Inhale 2 puffs every 6 (six) hours as needed for wheezing or shortness of breath 2 Inhaler 1    albuterol (PROVENTIL HFA,VENTOLIN HFA) 90 mcg/act inhaler Inhale 2 puffs every 6 (six) hours as needed for wheezing 19 g 0    budesonide-formoterol (Symbicort) 160-4.5 mcg/act inhaler Inhale 2 puffs 2 (two) times a day 30.6 g 0    buPROPion (WELLBUTRIN XL) 150 mg 24 hr tablet Take 1 tablet (150 mg total) by mouth every morning 30 tablet 2    cyclobenzaprine (FLEXERIL) 10 mg tablet Take 1 tablet (10 mg total) by mouth 3 (three) times a day as needed for muscle spasms 30 tablet 0    ibuprofen (MOTRIN) 200 mg tablet Take by mouth every 6 (six) hours as needed for mild pain      lidocaine (XYLOCAINE) 5 % ointment Apply topically Three times daily as needed      meloxicam (MOBIC) 7.5 mg tablet Take 1 tablet (7.5 mg total) by mouth daily 15 tablet 0    montelukast (SINGULAIR) 10 mg tablet Take 1 tablet (10 mg total) by mouth daily at bedtime 30 tablet 11    omeprazole (PriLOSEC) 20 mg delayed release capsule Take 1 capsule (20 mg total) by mouth daily as needed (GERD) 90 capsule 1    triamcinolone (KENALOG) 0.025 % cream Apply topically 2 (two) times a day 15 g 0    cyclobenzaprine (FLEXERIL) 10 mg tablet  "Take 1 tablet (10 mg total) by mouth 3 (three) times a day as needed for muscle spasms for up to 5 days (Patient not taking: Reported on 2/6/2023) 15 tablet 0    mupirocin (BACTROBAN) 2 % ointment Apply topically 3 (three) times a day (Patient not taking: Reported on 1/10/2024) 15 g 0     No current facility-administered medications for this visit.      Allergies:     Allergies   Allergen Reactions    Shellfish Allergy - Food Allergy Swelling      Physical Exam:     /82 (BP Location: Right arm, Patient Position: Sitting, Cuff Size: Standard)   Pulse 97   Ht 5' 2\" (1.575 m)   Wt 71.7 kg (158 lb)   SpO2 98%   BMI 28.90 kg/m²     Physical Exam     Jonathan Portillo MD  [facility]    "

## 2024-01-18 NOTE — PATIENT INSTRUCTIONS
1. Annual physical exam  -     CBC; Future  -     Comprehensive metabolic panel; Future  -     Lipid panel; Future  -     Hemoglobin A1C; Future    2. Moderate persistent extrinsic asthma without status asthmaticus without complication    3. Encounter for screening colonoscopy    4. Gastroesophageal reflux disease, unspecified whether esophagitis present  -     omeprazole (PriLOSEC) 20 mg delayed release capsule; Take 1 capsule (20 mg total) by mouth daily as needed (GERD)    5. Exacerbation of asthma, unspecified asthma severity, unspecified whether persistent  -     montelukast (SINGULAIR) 10 mg tablet; Take 1 tablet (10 mg total) by mouth daily at bedtime    6. Allergic rhinitis, unspecified seasonality, unspecified trigger  -     montelukast (SINGULAIR) 10 mg tablet; Take 1 tablet (10 mg total) by mouth daily at bedtime    7. Mild persistent asthma without complication  -     budesonide-formoterol (Symbicort) 160-4.5 mcg/act inhaler; Inhale 2 puffs 2 (two) times a day

## 2024-01-19 ENCOUNTER — TELEPHONE (OUTPATIENT)
Dept: ADMINISTRATIVE | Facility: OTHER | Age: 49
End: 2024-01-19

## 2024-01-19 NOTE — TELEPHONE ENCOUNTER
----- Message from Jonathan Portillo MD sent at 1/19/2024 10:27 AM EST -----  01/19/24 10:27 AM    Hello, our patient No patient name on file. has had CRC: Colonoscopy completed/performed. Please assist in updating the patient chart by making an External outreach to [facility] The date of service is 06/27/2018.    Thank you,  Jonathan Portillo MD  [facility]    06/27/2018 2:50

## 2024-01-19 NOTE — TELEPHONE ENCOUNTER
PA for budesonide-formoterol (Symbicort) 160-4.5 mcg/act inhaler  Denied    Reason:            Message sent to provider pool Yes    Denial letter scanned into Media Yes    Appeal started No

## 2024-01-19 NOTE — TELEPHONE ENCOUNTER
----- Message from Wale Camilo sent at 1/18/2024  1:07 PM EST -----  01/18/24 1:07 PM    Hello, our patient Loretta Mendez has had Hepatitis C and HIV completed/performed. Please assist in updating the patient chart by pulling the Care Everywhere (CE) document. The date of service is Hepatitis C Screening Completed 04/07/2017& HIV Screen 1975.     Thank you,  Wale Camilo  [facility] PRIMARY [facility]ARE

## 2024-01-19 NOTE — TELEPHONE ENCOUNTER
Upon review of the In Basket request we were able to locate, review, and update the patient chart as requested for Hepatitis C .    Any additional questions or concerns should be emailed to the Practice Liaisons via the appropriate education email address, please do not reply via In Basket.    Thank you  Mildred Rodarte

## 2024-01-19 NOTE — TELEPHONE ENCOUNTER
PA for budesonide-formoterol (Symbicort) 160-4.5 mcg/act inhaler     Submitted via  []CMM-KEY   [x][employer]Case ID # [id number]  []Faxed to plan   []Other website   []Phone call Case ID #     Office notes sent, clinical questions answered. Awaiting determination

## 2024-01-22 ENCOUNTER — PATIENT MESSAGE (OUTPATIENT)
Dept: FAMILY MEDICINE CLINIC | Facility: CLINIC | Age: 49
End: 2024-01-22

## 2024-01-22 DIAGNOSIS — R73.03 PREDIABETES: Primary | ICD-10-CM

## 2024-01-22 RX ORDER — FLUTICASONE FUROATE AND VILANTEROL 200; 25 UG/1; UG/1
1 POWDER RESPIRATORY (INHALATION) DAILY
Qty: 60 BLISTER | Refills: 5 | Status: SHIPPED | OUTPATIENT
Start: 2024-01-22 | End: 2024-07-20

## 2024-01-22 NOTE — TELEPHONE ENCOUNTER
Clinic Care Coordination Contact    Situation: Patient chart reviewed by care coordinator.    Background:   RN please review for maintenance, CHW see delegation in Plan section     Routing comment          Assessment:   Chart review completed today.   Per  recommendation, to step down to maintenance   No new concerns or goals to work on    Plan/Recommendations:   Okay to step down to maintenance    Emergency Plan Recommendation:    When to Use the Emergency Department (ED)  An emergency means you could die if you don t get care quickly. Or you could be hurt permanently (disabled). Read below to know when to use -- and when not to use -- an emergency department (also called ED).    Dangers to your life  Here are examples of emergencies. These need immediate care:  A hard time breathing  Severe chest pain  Choking  Severe bleeding  Suddenly not able to move or speak  Blacking out (fainting)`  Poisoning    Dangers of permanent injuries  Here are other emergencies. These also need immediate care:  Deep cuts or severe burns  Broken bones, or sudden severe pain and swelling in a joint    When it s an emergency  If you have an emergency, follow these steps:    1. Go to the nearest emergency department  If you can, go to the hospital ED closest to you right away.  If you cannot get there right away, or if it is not safe to take yourself, call 911 or your police emergency number.  2. Call your primary care doctor  Tell your doctor about the emergency. Call within 24 hours of going to the ED.  If you cannot call, have someone call for you.  Go to your doctor (not the ED) for any follow-up care.    When it s not an emergency  If a problem is not an emergency, follow these steps:    1. Call your primary care doctor  If you don t know the name of your doctor, call your health plan.  If you cannot call, have someone call for you.  2. Follow instructions  Your doctor will tell you what you should do.  You may be told to see your  Pt will like to try the Breo. She is also waiting for her lab results.   doctor right away. You may be told to go to the ED. Or you may be told to go to an urgent care center.  Follow your doctor s advice.  Goal:   I would like support to get the Medical Waiver [id number] complete by PCP or psychotherapist ([facility]) within the 3 months.    Action Steps:  1. I will inform Care Guide when Medical waiver is completed to make copies and give to PCP.  2. I will send the original Medical Waiver to my .    Alzheimer Disease  Alzheimer disease is a brain illness that can happen usually in older adults, but it can also happen as early as age 40. It is the most common cause of dementia. It is a progressive disease. This means it gets worse over time.  What is Alzheimer disease?  Alzheimer disease causes a series of changes to nerves of the brain. Some nerves form into clumps and tangles, and lose some of their connections to other nerves.  Healthcare providers don t fully understand what causes Alzheimer disease. But they think these may be some of the causes:  Age and family history  Certain genes  Abnormal protein deposits in the brain  Environmental factors  Problems with a person s immune system  Possibly infections  Symptoms of Alzheimer disease  The disease causes changes in behavior and thinking known as dementia. The symptoms include:  Memory loss  Confusion  Restlessness  Personality and behavior changes  Problems with judgment  Problems communicating with others  Inability to follow directions  Lack of emotion  Diagnosing Alzheimer disease  No single test is able to diagnose Alzheimer disease. Instead healthcare providers use a series of tests to rule out other health conditions. The tests may include:  A complete medical history. This may include questions about overall health and past health problems. The healthcare provider may ask how well the person can do daily tasks. The healthcare provider may ask family or close friends about any changes in behavior or  personality.  Mental status test. This is a test of memory, problem solving, attention, counting, and language.   Standard medical tests. These may include blood and urine tests to find possible causes for the problem.  Brain imaging tests. CT, MRI, or positron emission tomography (PET) may be used to rule out other causes of the problem.  Treating Alzheimer disease  Alzheimer disease has no cure. Instead healthcare providers can help ease some symptoms. This can make a person with Alzheimer more comfortable. Treatment can also make it easier for their caregivers to take care of them.  Some medicines may help slow the decline of a person s memory, thinking, and language skills. They may help with problems of behavior, such as aggression. They can lessen hallucinations and delusions. These medicines can work for some but not all people. And they may help for only a limited time. Medicines include:  Cholinesterase inhibitors  Donepezil  Galantamine  Rivastigmine  Memantine  In some cases, behavior problems can be caused by medicine side effects. Talk with the person s healthcare provider about all medicines he or she is taking.  Keeping healthy  For a person with Alzheimer, it s important to stay healthy. Good nutrition and physical and social activity are vital. A calm and well-structured environment will help. Make sure to keep up with healthcare appointments and managing other health conditions, such as diabetes. Some people benefit from having a nutritionist help to prevent weight loss.   Caring for someone with Alzheimer  A person with Alzheimer will need more caregiving over time. Talk with your healthcare provider about caregiving resources.

## 2024-01-22 NOTE — TELEPHONE ENCOUNTER
Upon review of the In Basket request we were able to locate, review, and update the patient chart as requested for CRC: Colonoscopy.    Any additional questions or concerns should be emailed to the Practice Liaisons via the appropriate education email address, please do not reply via In Basket.    Thank you  Marycarmen Merino

## 2024-01-26 ENCOUNTER — TELEPHONE (OUTPATIENT)
Age: 49
End: 2024-01-26

## 2024-01-26 DIAGNOSIS — F90.9 ATTENTION DEFICIT HYPERACTIVITY DISORDER (ADHD), UNSPECIFIED ADHD TYPE: Primary | ICD-10-CM

## 2024-01-26 DIAGNOSIS — J45.30 MILD PERSISTENT ASTHMA WITHOUT COMPLICATION: ICD-10-CM

## 2024-01-26 RX ORDER — ALBUTEROL SULFATE 90 UG/1
2 AEROSOL, METERED RESPIRATORY (INHALATION) EVERY 6 HOURS PRN
Qty: 8 G | Refills: 1 | Status: SHIPPED | OUTPATIENT
Start: 2024-01-26

## 2024-01-26 RX ORDER — ATOMOXETINE 40 MG/1
40 CAPSULE ORAL DAILY
Qty: 30 CAPSULE | Refills: 1 | Status: SHIPPED | OUTPATIENT
Start: 2024-01-26

## 2024-01-26 NOTE — TELEPHONE ENCOUNTER
Pt called to follow up on the status of new medication, Strattera, discussed with Dr. Portillo through [employer]. Also she previously sent a refill request through [facility] to [facility] on 1/16/24 for albuterol inhaler. She would like both prescriptions called into [facility]. Please call pt and advise. She thanks us for our help.

## 2024-02-02 RX ORDER — ALBUTEROL SULFATE 90 UG/1
2 AEROSOL, METERED RESPIRATORY (INHALATION) EVERY 6 HOURS PRN
Qty: 19 G | Refills: 0 | Status: SHIPPED | OUTPATIENT
Start: 2024-02-02

## 2024-02-02 RX ORDER — MONTELUKAST SODIUM 10 MG/1
10 TABLET ORAL
Qty: 30 TABLET | Refills: 0 | Status: SHIPPED | OUTPATIENT
Start: 2024-02-02

## 2024-02-09 DIAGNOSIS — J30.9 ALLERGIC RHINITIS, UNSPECIFIED SEASONALITY, UNSPECIFIED TRIGGER: ICD-10-CM

## 2024-02-09 DIAGNOSIS — J45.901 EXACERBATION OF ASTHMA, UNSPECIFIED ASTHMA SEVERITY, UNSPECIFIED WHETHER PERSISTENT: ICD-10-CM

## 2024-02-09 RX ORDER — MONTELUKAST SODIUM 10 MG/1
10 TABLET ORAL
Qty: 90 TABLET | Refills: 1 | Status: SHIPPED | OUTPATIENT
Start: 2024-02-09

## 2024-02-15 ENCOUNTER — HOSPITAL ENCOUNTER (OUTPATIENT)
Dept: MAMMOGRAPHY | Facility: CLINIC | Age: 49
End: 2024-02-15
Payer: COMMERCIAL

## 2024-02-15 ENCOUNTER — HOSPITAL ENCOUNTER (OUTPATIENT)
Dept: ULTRASOUND IMAGING | Facility: CLINIC | Age: 49
End: 2024-02-15
Payer: COMMERCIAL

## 2024-02-15 DIAGNOSIS — N63.10 UNSPECIFIED LUMP IN THE RIGHT BREAST, UNSPECIFIED QUADRANT: ICD-10-CM

## 2024-02-15 DIAGNOSIS — R92.8 OTHER ABNORMAL AND INCONCLUSIVE FINDINGS ON DIAGNOSTIC IMAGING OF BREAST: ICD-10-CM

## 2024-02-15 PROCEDURE — 77066 DX MAMMO INCL CAD BI: CPT

## 2024-02-15 PROCEDURE — G0279 TOMOSYNTHESIS, MAMMO: HCPCS

## 2024-02-15 PROCEDURE — 76642 ULTRASOUND BREAST LIMITED: CPT

## 2024-02-16 ENCOUNTER — TELEPHONE (OUTPATIENT)
Dept: SURGERY | Facility: CLINIC | Age: 49
End: 2024-02-16

## 2024-02-16 NOTE — TELEPHONE ENCOUNTER
Spoke with patient discussed results as well as made sure she was scheduled for repeat testing. ALSO SCHEDULED PATIENT FOR COLONOSCOPY CONSULT,

## 2024-02-16 NOTE — TELEPHONE ENCOUNTER
----- Message from Tootie Dc MD sent at 2/16/2024 10:03 AM EST -----  Please ensure that the follow-up studies, films, or labs as noted by this result are ordered and arranged for the patient. Please notify patient.  Thanks

## 2024-02-20 DIAGNOSIS — Z00.6 ENCOUNTER FOR EXAMINATION FOR NORMAL COMPARISON OR CONTROL IN CLINICAL RESEARCH PROGRAM: ICD-10-CM

## 2024-02-22 DIAGNOSIS — F90.9 ATTENTION DEFICIT HYPERACTIVITY DISORDER (ADHD), UNSPECIFIED ADHD TYPE: ICD-10-CM

## 2024-02-26 ENCOUNTER — APPOINTMENT (OUTPATIENT)
Dept: LAB | Facility: CLINIC | Age: 49
End: 2024-02-26

## 2024-02-26 DIAGNOSIS — Z00.6 ENCOUNTER FOR EXAMINATION FOR NORMAL COMPARISON OR CONTROL IN CLINICAL RESEARCH PROGRAM: ICD-10-CM

## 2024-02-26 PROCEDURE — 36415 COLL VENOUS BLD VENIPUNCTURE: CPT

## 2024-02-26 RX ORDER — ATOMOXETINE 40 MG/1
40 CAPSULE ORAL DAILY
Qty: 90 CAPSULE | Refills: 0 | Status: SHIPPED | OUTPATIENT
Start: 2024-02-26

## 2024-03-07 ENCOUNTER — TELEPHONE (OUTPATIENT)
Age: 49
End: 2024-03-07

## 2024-03-07 NOTE — TELEPHONE ENCOUNTER
Patient is called to requesting a script for a 1 year repeat MRI of the breast. Please call patient [phone].

## 2024-03-14 LAB
APOB+LDLR+PCSK9 GENE MUT ANL BLD/T: NOT DETECTED
BRCA1+BRCA2 DEL+DUP + FULL MUT ANL BLD/T: NOT DETECTED
MLH1+MSH2+MSH6+PMS2 GN DEL+DUP+FUL M: NOT DETECTED

## 2024-04-22 ENCOUNTER — TELEPHONE (OUTPATIENT)
Dept: FAMILY MEDICINE CLINIC | Facility: CLINIC | Age: 49
End: 2024-04-22

## 2024-08-09 ENCOUNTER — HOSPITAL ENCOUNTER (OUTPATIENT)
Dept: RADIOLOGY | Facility: HOSPITAL | Age: 49
Discharge: HOME | End: 2024-08-09
Attending: SURGERY
Payer: COMMERCIAL

## 2024-08-09 DIAGNOSIS — R93.89 ABNORMAL MRI: ICD-10-CM

## 2024-08-09 DIAGNOSIS — N63.10 MASS OF RIGHT BREAST, UNSPECIFIED QUADRANT: ICD-10-CM

## 2024-08-09 PROCEDURE — C8937 CAD BREAST MRI: HCPCS

## 2024-08-09 PROCEDURE — C8908 MRI W/O FOL W/CONT, BREAST,: HCPCS

## 2024-08-12 NOTE — RESULT ENCOUNTER NOTE
Please call pt with normal or benign or stable results.  These do not need any further workup or management.     Follow up as indicated on last visit.    HOWEVER LEFT  breast mammogram and left breast ultrasound were recommended now also ---please order this.  For now.  This is a follow-up from February 2024 as per the recommendations to document stability of probably benign findings previously identified.

## 2024-08-13 ENCOUNTER — TELEPHONE (OUTPATIENT)
Dept: SURGERY | Facility: CLINIC | Age: 49
End: 2024-08-13

## 2024-08-13 NOTE — TELEPHONE ENCOUNTER
----- Message from Kenya LUNDBERG sent at 8/12/2024  1:31 PM EDT -----    ----- Message -----  From: Tootie Dc MD  Sent: 8/12/2024   1:14 PM EDT  To: General Surgery [facility]    Please call pt with normal or benign or stable results.  These do not need any further workup or management.     Follow up as indicated on last visit.    HOWEVER LEFT  breast mammogram and left breast ultrasound were recommended now also ---please order this.  For now.  This is a follow-up from February 2024 as per the recommendations to document stability of probably benign findings previously identified.

## 2024-08-13 NOTE — TELEPHONE ENCOUNTER
Detailed message left for patient. MRI is benign.    US and mammogram is already scheduled for 8/15/24

## 2024-08-15 ENCOUNTER — HOSPITAL ENCOUNTER (OUTPATIENT)
Dept: ULTRASOUND IMAGING | Facility: CLINIC | Age: 49
End: 2024-08-15
Payer: COMMERCIAL

## 2024-08-15 ENCOUNTER — HOSPITAL ENCOUNTER (OUTPATIENT)
Dept: MAMMOGRAPHY | Facility: CLINIC | Age: 49
End: 2024-08-15
Payer: COMMERCIAL

## 2024-08-15 VITALS — WEIGHT: 159 LBS | BODY MASS INDEX: 29.26 KG/M2 | HEIGHT: 62 IN

## 2024-08-15 DIAGNOSIS — R92.8 MAMMOGRAM ABNORMAL: ICD-10-CM

## 2024-08-15 PROCEDURE — 77065 DX MAMMO INCL CAD UNI: CPT

## 2024-08-15 PROCEDURE — 76642 ULTRASOUND BREAST LIMITED: CPT

## 2024-08-15 NOTE — RESULT ENCOUNTER NOTE
Please ensure that the follow-up studies, films, or labs as noted by this result are ordered and arranged for the patient. Please notify patient.  Thanks    Please make sure to order appropriate 6-month follow-up studies as noted here.  Thank you.

## 2024-08-16 ENCOUNTER — TELEPHONE (OUTPATIENT)
Dept: SURGERY | Facility: CLINIC | Age: 49
End: 2024-08-16

## 2024-08-16 NOTE — TELEPHONE ENCOUNTER
Message left for patient regarding mammogram and US results and additional testing needed.     ADDITIONAL TESTING :  ULTRASOUND AND MAMMOGRAM ARE ALREADY SCHEDULED FOR 01/17/25

## 2024-08-16 NOTE — TELEPHONE ENCOUNTER
----- Message from Kenya LUNDBERG sent at 8/15/2024  2:51 PM EDT -----    ----- Message -----  From: Tootie Dc MD  Sent: 8/15/2024   2:15 PM EDT  To: General Surgery [facility]    Please ensure that the follow-up studies, films, or labs as noted by this result are ordered and arranged for the patient. Please notify patient.  Thanks    Please make sure to order appropriate 6-month follow-up studies as noted here.  Thank you.

## 2024-09-13 ENCOUNTER — OFFICE VISIT (OUTPATIENT)
Dept: FAMILY MEDICINE CLINIC | Facility: CLINIC | Age: 49
End: 2024-09-13
Payer: COMMERCIAL

## 2024-09-13 VITALS
BODY MASS INDEX: 29.19 KG/M2 | WEIGHT: 158.6 LBS | DIASTOLIC BLOOD PRESSURE: 62 MMHG | HEIGHT: 62 IN | OXYGEN SATURATION: 98 % | HEART RATE: 88 BPM | SYSTOLIC BLOOD PRESSURE: 128 MMHG | RESPIRATION RATE: 20 BRPM | TEMPERATURE: 98.7 F

## 2024-09-13 DIAGNOSIS — G89.29 CHRONIC BILATERAL LOW BACK PAIN WITHOUT SCIATICA: ICD-10-CM

## 2024-09-13 DIAGNOSIS — M54.50 CHRONIC BILATERAL LOW BACK PAIN WITHOUT SCIATICA: ICD-10-CM

## 2024-09-13 DIAGNOSIS — J45.40 MODERATE PERSISTENT EXTRINSIC ASTHMA WITHOUT STATUS ASTHMATICUS WITHOUT COMPLICATION: ICD-10-CM

## 2024-09-13 DIAGNOSIS — J45.901 MODERATE ASTHMA WITH EXACERBATION, UNSPECIFIED WHETHER PERSISTENT: Primary | ICD-10-CM

## 2024-09-13 DIAGNOSIS — J30.9 ALLERGIC RHINITIS, UNSPECIFIED SEASONALITY, UNSPECIFIED TRIGGER: ICD-10-CM

## 2024-09-13 DIAGNOSIS — J45.901 EXACERBATION OF ASTHMA, UNSPECIFIED ASTHMA SEVERITY, UNSPECIFIED WHETHER PERSISTENT: ICD-10-CM

## 2024-09-13 PROCEDURE — 99214 OFFICE O/P EST MOD 30 MIN: CPT | Performed by: FAMILY MEDICINE

## 2024-09-13 RX ORDER — MONTELUKAST SODIUM 10 MG/1
10 TABLET ORAL
Qty: 30 TABLET | Refills: 5 | Status: SHIPPED | OUTPATIENT
Start: 2024-09-13

## 2024-09-13 RX ORDER — PREDNISONE 20 MG/1
40 TABLET ORAL DAILY
Qty: 14 TABLET | Refills: 0 | Status: SHIPPED | OUTPATIENT
Start: 2024-09-13 | End: 2024-09-20

## 2024-09-13 RX ORDER — FLUTICASONE FUROATE AND VILANTEROL 200; 25 UG/1; UG/1
1 POWDER RESPIRATORY (INHALATION) DAILY
Qty: 60 BLISTER | Refills: 5 | Status: SHIPPED | OUTPATIENT
Start: 2024-09-13 | End: 2024-09-13 | Stop reason: SDUPTHER

## 2024-09-13 RX ORDER — PREDNISONE 20 MG/1
40 TABLET ORAL DAILY
Qty: 10 TABLET | Refills: 0 | Status: SHIPPED | OUTPATIENT
Start: 2024-09-13 | End: 2024-09-13 | Stop reason: SDUPTHER

## 2024-09-13 RX ORDER — FLUTICASONE FUROATE AND VILANTEROL 200; 25 UG/1; UG/1
1 POWDER RESPIRATORY (INHALATION) DAILY
Qty: 60 BLISTER | Refills: 5 | Status: SHIPPED | OUTPATIENT
Start: 2024-09-13 | End: 2025-03-12

## 2024-09-13 RX ORDER — MONTELUKAST SODIUM 10 MG/1
10 TABLET ORAL
Qty: 30 TABLET | Refills: 0 | Status: SHIPPED | OUTPATIENT
Start: 2024-09-13 | End: 2024-09-13 | Stop reason: SDUPTHER

## 2024-09-13 NOTE — PROGRESS NOTES
Ambulatory Visit  Name: Loretta Mendez      : 1975      MRN: [id number]  Encounter Provider: Jonathan Portillo MD  Encounter Date: 2024   Encounter department: [facility]    Assessment & Plan  Moderate asthma with exacerbation, unspecified whether persistent  Start prednisone, restart fluticasone vilanterol, previous side effect likely due to not rinsing, if worsening advised to go the emergency room    Orders:    predniSONE 20 mg tablet; Take 2 tablets (40 mg total) by mouth daily for 7 days    Moderate persistent extrinsic asthma without status asthmaticus without complication    Orders:    fluticasone-vilanterol 200-25 mcg/actuation inhaler; Inhale 1 puff daily Rinse mouth after use.    Exacerbation of asthma, unspecified asthma severity, unspecified whether persistent    Orders:    montelukast (SINGULAIR) 10 mg tablet; Take 1 tablet (10 mg total) by mouth daily at bedtime    Allergic rhinitis, unspecified seasonality, unspecified trigger    Orders:    montelukast (SINGULAIR) 10 mg tablet; Take 1 tablet (10 mg total) by mouth daily at bedtime    Chronic bilateral low back pain without sciatica  Continues to have low back pain, previously seen by management without relief, advised to continue to exercise discuss furhter after asthma flair resolved.            History of Present Illness     Cough  This is a recurrent problem. The current episode started yesterday. The cough is Non-productive. Associated symptoms include headaches, myalgias, nasal congestion, rhinorrhea, shortness of breath and wheezing. Pertinent negatives include no chest pain. She has tried a beta-agonist inhaler for the symptoms. The treatment provided mild relief. Her past medical history is significant for asthma.     Shortness of breath only with exertion none at rest.          Review of Systems   HENT:  Positive for rhinorrhea.    Respiratory:  Positive for cough, shortness of breath and  "wheezing.    Cardiovascular:  Negative for chest pain and palpitations.   Musculoskeletal:  Positive for myalgias.   Neurological:  Positive for headaches.           Objective     /62 (BP Location: Left arm, Patient Position: Sitting, Cuff Size: Standard)   Pulse 88   Temp 98.7 °F (37.1 °C) (Tympanic)   Resp 20   Ht 5' 2\" (1.575 m)   Wt 71.9 kg (158 lb 9.6 oz)   SpO2 98%   BMI 29.01 kg/m²     Physical Exam  Constitutional:       Appearance: Normal appearance.   Cardiovascular:      Rate and Rhythm: Normal rate and regular rhythm.   Pulmonary:      Effort: Pulmonary effort is normal.      Breath sounds: Wheezing present. No rhonchi.   Abdominal:      General: Abdomen is flat.   Neurological:      General: No focal deficit present.      Mental Status: She is alert and oriented to person, place, and time.         "

## 2024-09-13 NOTE — ASSESSMENT & PLAN NOTE
Orders:    fluticasone-vilanterol 200-25 mcg/actuation inhaler; Inhale 1 puff daily Rinse mouth after use.

## 2024-09-13 NOTE — ASSESSMENT & PLAN NOTE
Continues to have low back pain, previously seen by management without relief, advised to continue to exercise discuss furhter after asthma flair resolved.

## 2024-09-16 ENCOUNTER — TELEPHONE (OUTPATIENT)
Age: 49
End: 2024-09-16

## 2024-09-16 ENCOUNTER — PATIENT MESSAGE (OUTPATIENT)
Dept: FAMILY MEDICINE CLINIC | Facility: CLINIC | Age: 49
End: 2024-09-16

## 2024-09-16 DIAGNOSIS — J45.901 MODERATE ASTHMA WITH EXACERBATION, UNSPECIFIED WHETHER PERSISTENT: Primary | ICD-10-CM

## 2024-09-16 NOTE — TELEPHONE ENCOUNTER
Pt has hemorrhoids  that are bleeding, pt states it bleed through her pants on Friday and is still bleeding and would like to be seen ASAP.  I explained Dr Dc is not taking on any new appts and Dr Castellano does not treat hemorrhoids per Rashida the office.  I transferred pt to [facility] in [place] rectal to help the pt

## 2024-09-17 ENCOUNTER — OFFICE VISIT (OUTPATIENT)
Dept: GASTROENTEROLOGY | Facility: MEDICAL CENTER | Age: 49
End: 2024-09-17
Payer: COMMERCIAL

## 2024-09-17 ENCOUNTER — APPOINTMENT (OUTPATIENT)
Dept: RADIOLOGY | Facility: MEDICAL CENTER | Age: 49
End: 2024-09-17
Payer: COMMERCIAL

## 2024-09-17 ENCOUNTER — TELEPHONE (OUTPATIENT)
Dept: GASTROENTEROLOGY | Facility: MEDICAL CENTER | Age: 49
End: 2024-09-17

## 2024-09-17 VITALS
HEART RATE: 80 BPM | HEIGHT: 62 IN | WEIGHT: 159.6 LBS | TEMPERATURE: 98.3 F | SYSTOLIC BLOOD PRESSURE: 117 MMHG | OXYGEN SATURATION: 98 % | BODY MASS INDEX: 29.37 KG/M2 | DIASTOLIC BLOOD PRESSURE: 78 MMHG

## 2024-09-17 DIAGNOSIS — K62.5 RECTAL BLEEDING: ICD-10-CM

## 2024-09-17 DIAGNOSIS — K64.9 HEMORRHOIDS, UNSPECIFIED HEMORRHOID TYPE: Primary | ICD-10-CM

## 2024-09-17 DIAGNOSIS — K58.2 IRRITABLE BOWEL SYNDROME WITH ALTERNATING BOWEL HABITS: ICD-10-CM

## 2024-09-17 DIAGNOSIS — J45.901 MODERATE ASTHMA WITH EXACERBATION, UNSPECIFIED WHETHER PERSISTENT: ICD-10-CM

## 2024-09-17 PROCEDURE — 99204 OFFICE O/P NEW MOD 45 MIN: CPT | Performed by: INTERNAL MEDICINE

## 2024-09-17 PROCEDURE — 71046 X-RAY EXAM CHEST 2 VIEWS: CPT

## 2024-09-17 RX ORDER — BUTALBITAL, ACETAMINOPHEN AND CAFFEINE 50; 325; 40 MG/1; MG/1; MG/1
1 TABLET ORAL EVERY 4 HOURS PRN
COMMUNITY
Start: 2024-07-19

## 2024-09-17 RX ORDER — HYDROCORTISONE ACETATE 25 MG/1
25 SUPPOSITORY RECTAL 2 TIMES DAILY
Qty: 12 SUPPOSITORY | Refills: 0 | Status: SHIPPED | OUTPATIENT
Start: 2024-09-17

## 2024-09-17 NOTE — TELEPHONE ENCOUNTER
Procedure: Colonoscopy  Date: 12/06/2024  Physician performing: Dr. Jaiyeola  Location of procedure:  [place]  Instructions given to patient: Yes  Diabetic: No  Clearances: No

## 2024-09-17 NOTE — PROGRESS NOTES
[facility] - Outpatient Consultation  Loretta Mendez 49 y.o. female MRN: [id number]  Encounter: [id number]          ASSESSMENT AND PLAN:  49-year-old female with history of asthma who presents for evaluation.    1. Hemorrhoids, unspecified hemorrhoid type  2. Rectal bleeding  3. Irritable bowel syndrome with alternating bowel habits  She reports years of intermittent rectal bleeding.  She declined a rectal exam during the office visit.  On review of her prior colonoscopy she was found to have internal and external hemorrhoids.  More recently she had passage of a moderate amount of bright blood per rectum associated with clots.  I discussed hemorrhoid management with her today including high-fiber diet, fiber supplementation with goal 20 g/day and adequate hydration.  Recommend starting Anusol suppositories twice daily for the next week as well.  Given her ongoing rectal bleeding should be scheduled for colonoscopy as well.  Depending on the results and if she continues to have symptoms she may be interested in referral with colorectal surgery in the future to discuss hemorrhoidectomy.        - hydrocortisone (ANUSOL-HC) 25 mg suppository; Insert 1 suppository (25 mg total) into the rectum 2 (two) times a day  Dispense: 12 suppository; Refill: 0  - psyllium (METAMUCIL) 58.6 % powder; Take 1 packet by mouth daily  Dispense: 30 packet; Refill: 3    - Colonoscopy; Future        I obtained informed consent from the patient. The risks/benefits/alternatives of the procedure were discussed with the patient. Risks included, but not limited to, infection, bleeding, perforation, injury to organs in the abdomen, missed lesion and incomplete procedure were discussed.     ______________________________________________________________________    HPI: 49-year-old female with history of asthma who presents for evaluation.    She reports intermittent rectal bleeding for several years.  She  previously underwent 2 colonoscopies in her life in 2016 and 2018 was found to have internal and external hemorrhoids.  Approximately a week ago she had onset of moderate rectal bleeding with passage of clots.  She usually has alternating bowel habits with looser stool and constipation at times.  She reports taking Linzess in the past but is not currently using any medications for a bowel regimen.  She reports that the bleeding has been slowing but she continues to have passage of bright red blood per rectum.  She reports lower abdominal discomfort at times, denies nausea or vomiting.  She is also prescribed prednisone for an upper respiratory infection in the setting of her asthma    She has no family history of colorectal cancer or other gastrointestinal diseases  She takes no antiplatelet or anticoagulant medication    2018 colonoscopy showed internal hemorrhoids otherwise normal  2018 EGD showed gastritis otherwise normal  2016 colonoscopy was normal other than internal and external hemorrhoid    8/2024 liver enzymes are within normal limits  She has no recent abdominal imaging available for review      REVIEW OF SYSTEMS:    CONSTITUTIONAL: Denies any fever, chills, rigors, and weight loss.  HEENT: No earache or tinnitus. Denies hearing loss or visual disturbances.  CARDIOVASCULAR: No chest pain or palpitations.   RESPIRATORY: Denies any cough, hemoptysis, shortness of breath or dyspnea on exertion.  GASTROINTESTINAL: As noted in the History of Present Illness.   GENITOURINARY: No problems with urination. Denies any hematuria or dysuria.  NEUROLOGIC: No dizziness or vertigo, denies headaches.   MUSCULOSKELETAL: Denies any muscle or joint pain.   SKIN: Denies skin rashes or itching.   ENDOCRINE: Denies excessive thirst. Denies intolerance to heat or cold.  PSYCHOSOCIAL: Denies depression or anxiety. Denies any recent memory loss.       Historical Information   Past Medical History:   Diagnosis Date    Anemia      Asthma     History of transfusion     approx 10 yrs ago    Low back pain     Neck pain     Wears glasses      Past Surgical History:   Procedure Laterality Date    BREAST CYST EXCISION      COLONOSCOPY      DILATION AND CURETTAGE OF UTERUS      ESOPHAGOGASTRODUODENOSCOPY      EYE SURGERY      HYSTERECTOMY      age 35    OOPHORECTOMY Left     age 35    OVARY SURGERY      CA BREAST REDUCTION Bilateral 09/19/2017    Procedure: REDUCTION MAMMOPLASTY BREAST;  Surgeon: Yair Torre MD;  Location: [facility];  Service: Plastics    REDUCTION MAMMAPLASTY Bilateral 2017    US GUIDED BREAST BIOPSY RIGHT COMPLETE Right 8/30/2023     Social History   Social History     Substance and Sexual Activity   Alcohol Use Not Currently    Comment: social     Social History     Substance and Sexual Activity   Drug Use No     Social History     Tobacco Use   Smoking Status Never   Smokeless Tobacco Never     Family History   Problem Relation Age of Onset    Breast cancer Mother 55    Diabetes Father     Ovarian cancer Sister 38    Breast cancer Maternal Aunt 50    Colon cancer Neg Hx     Stomach cancer Neg Hx        Meds/Allergies       Current Outpatient Medications:     albuterol (2.5 mg/3 mL) 0.083 % nebulizer solution    albuterol (PROVENTIL HFA,VENTOLIN HFA) 90 mcg/act inhaler    butalbital-acetaminophen-caffeine (FIORICET,ESGIC) -40 mg per tablet    cyclobenzaprine (FLEXERIL) 10 mg tablet    fluticasone-vilanterol 200-25 mcg/actuation inhaler    ibuprofen (MOTRIN) 200 mg tablet    meloxicam (MOBIC) 7.5 mg tablet    montelukast (SINGULAIR) 10 mg tablet    omeprazole (PriLOSEC) 20 mg delayed release capsule    predniSONE 20 mg tablet    albuterol (PROVENTIL HFA,VENTOLIN HFA) 90 mcg/act inhaler    atoMOXetine (STRATTERA) 40 mg capsule    lidocaine (XYLOCAINE) 5 % ointment    montelukast (SINGULAIR) 10 mg tablet    mupirocin (BACTROBAN) 2 % ointment    triamcinolone (KENALOG) 0.025 % cream    Allergies   Allergen Reactions     "Shellfish Allergy - Food Allergy Swelling           Objective     Blood pressure 117/78, pulse 80, temperature 98.3 °F (36.8 °C), temperature source Tympanic, height 5' 2\" (1.575 m), weight 72.4 kg (159 lb 9.6 oz), SpO2 98%. Body mass index is 29.19 kg/m².        PHYSICAL EXAM:      General Appearance:   Alert, cooperative, no distress   HEENT:   Normocephalic, atraumatic, anicteric.     Neck:  Supple, symmetrical, trachea midline   Lungs:   Clear to auscultation bilaterally; no rales, rhonchi or wheezing; respirations unlabored    Heart::   Regular rate and rhythm; no murmur, rub, or gallop.   Abdomen:   Soft, non-tender, non-distended; normal bowel sounds; no masses, no organomegaly    Genitalia:   Deferred    Rectal:   Deferred    Extremities:  No cyanosis, clubbing or edema    Pulses:  2+ and symmetric    Skin:  No jaundice, rashes, or lesions    Lymph nodes:  No palpable cervical lymphadenopathy        Lab Results:   No visits with results within 1 Day(s) from this visit.   Latest known visit with results is:   Appointment on 02/26/2024   Component Date Value    LEE SYNDROME DNA JAREN* 02/26/2024 Not Detected     HEREDITARY BREAST & OVAR* 02/26/2024 Not Detected     FAMILIAL HYPERCHOLESTERO* 02/26/2024 Not Detected          Radiology Results:   No results found.    This note was completed in part utilizing Dragon Software. Grammatical errors, random word insertions, spelling mistakes, and incomplete sentences may be an occasional consequence of this system secondary to software limitations, ambient noise, and hardware issues. If you have any questions or concerns about the content, text, or information contained within the body of this dictation, please contact the provider for clarification.   "

## 2024-10-31 ENCOUNTER — TELEPHONE (OUTPATIENT)
Age: 49
End: 2024-10-31

## 2024-11-12 ENCOUNTER — OFFICE VISIT (OUTPATIENT)
Dept: URGENT CARE | Age: 49
End: 2024-11-12
Payer: COMMERCIAL

## 2024-11-12 VITALS
RESPIRATION RATE: 19 BRPM | HEART RATE: 94 BPM | OXYGEN SATURATION: 99 % | TEMPERATURE: 97.1 F | DIASTOLIC BLOOD PRESSURE: 76 MMHG | SYSTOLIC BLOOD PRESSURE: 138 MMHG | BODY MASS INDEX: 29.63 KG/M2 | WEIGHT: 161 LBS | HEIGHT: 62 IN

## 2024-11-12 DIAGNOSIS — F41.9 ANXIETY: Primary | ICD-10-CM

## 2024-11-12 PROCEDURE — G0382 LEV 3 HOSP TYPE B ED VISIT: HCPCS

## 2024-11-12 NOTE — PROGRESS NOTES
[facility]        NAME: Loretta Mendez is a 49 y.o. female  : 1975    MRN: [id number]  DATE: 2024  TIME: 4:01 PM    Assessment and Plan   Anxiety [F41.9]  1. Anxiety          Presents for eval of episode of anxiety with associated chest tightness and headache. An hour ago had some stress at home. Requesting medication for anxiety ( states was on trazodone in past). Declined EKG in UC. Advised to f/u with PCP for medication needs and ER for worsening    Patient Instructions       Follow up with PCP in 3-5 days.  Proceed to  ER if symptoms worsen.    If tests have been performed at [facility], our office will contact you with results if changes need to be made to the care plan discussed with you at the visit.  You can review your full results on [facility].    Chief Complaint     Chief Complaint   Patient presents with    Headache     Patient states she had a issue at home and is feeling overwhelmed and is now having a headache.          History of Present Illness       Presents for eval of episode of anxiety with associated chest tightness and headache. An hour ago had some stress at home. Requesting medication for anxiety ( states was on trazodone in past). Declined EKG in UC. Advised to f/u with PCP for medication needs and ER for worsening        Review of Systems   Review of Systems   Cardiovascular:  Positive for chest pain. Negative for palpitations.   Neurological:  Positive for headaches. Negative for dizziness and numbness.   Psychiatric/Behavioral:  The patient is nervous/anxious.    All other systems reviewed and are negative.        Current Medications       Current Outpatient Medications:     albuterol (2.5 mg/3 mL) 0.083 % nebulizer solution, Take 1 vial (2.5 mg total) by nebulization every 6 (six) hours as needed for wheezing, Disp: 40 vial, Rfl: 0    albuterol (PROVENTIL HFA,VENTOLIN HFA) 90 mcg/act inhaler, Inhale 2 puffs every 6 (six) hours as needed for  wheezing, Disp: 19 g, Rfl: 0    albuterol (PROVENTIL HFA,VENTOLIN HFA) 90 mcg/act inhaler, Inhale 2 puffs every 6 (six) hours as needed for wheezing or shortness of breath, Disp: 8 g, Rfl: 1    atoMOXetine (STRATTERA) 40 mg capsule, TAKE 1 CAPSULE (40 MG TOTAL) BY MOUTH DAILY. (Patient not taking: Reported on 9/17/2024), Disp: 90 capsule, Rfl: 0    butalbital-acetaminophen-caffeine (FIORICET,ESGIC) -40 mg per tablet, Take 1 tablet by mouth every 4 (four) hours as needed, Disp: , Rfl:     cyclobenzaprine (FLEXERIL) 10 mg tablet, Take 1 tablet (10 mg total) by mouth 3 (three) times a day as needed for muscle spasms, Disp: 30 tablet, Rfl: 0    fluticasone-vilanterol 200-25 mcg/actuation inhaler, Inhale 1 puff daily Rinse mouth after use., Disp: 60 blister, Rfl: 5    hydrocortisone (ANUSOL-HC) 25 mg suppository, Insert 1 suppository (25 mg total) into the rectum 2 (two) times a day, Disp: 12 suppository, Rfl: 0    ibuprofen (MOTRIN) 200 mg tablet, Take by mouth every 6 (six) hours as needed for mild pain, Disp: , Rfl:     lidocaine (XYLOCAINE) 5 % ointment, Apply topically Three times daily as needed, Disp: , Rfl:     meloxicam (MOBIC) 7.5 mg tablet, Take 1 tablet (7.5 mg total) by mouth daily, Disp: 15 tablet, Rfl: 0    montelukast (SINGULAIR) 10 mg tablet, TAKE 1 TABLET BY MOUTH DAILY AT BEDTIME, Disp: 90 tablet, Rfl: 1    montelukast (SINGULAIR) 10 mg tablet, Take 1 tablet (10 mg total) by mouth daily at bedtime, Disp: 30 tablet, Rfl: 5    mupirocin (BACTROBAN) 2 % ointment, Apply topically 3 (three) times a day (Patient not taking: Reported on 1/10/2024), Disp: 15 g, Rfl: 0    omeprazole (PriLOSEC) 20 mg delayed release capsule, Take 1 capsule (20 mg total) by mouth daily as needed (GERD), Disp: 90 capsule, Rfl: 1    psyllium (METAMUCIL) 58.6 % powder, Take 1 packet by mouth daily, Disp: 30 packet, Rfl: 3    triamcinolone (KENALOG) 0.025 % cream, Apply topically 2 (two) times a day (Patient not taking:  "Reported on 9/17/2024), Disp: 15 g, Rfl: 0    Current Allergies     Allergies as of 11/12/2024 - Reviewed 11/12/2024   Allergen Reaction Noted    Shellfish allergy - food allergy Swelling 03/09/2023            The following portions of the patient's history were reviewed and updated as appropriate: allergies, current medications, past family history, past medical history, past social history, past surgical history and problem list.     Past Medical History:   Diagnosis Date    Anemia     Asthma     History of transfusion     approx 10 yrs ago    Low back pain     Neck pain     Wears glasses        Past Surgical History:   Procedure Laterality Date    BREAST CYST EXCISION      COLONOSCOPY      DILATION AND CURETTAGE OF UTERUS      ESOPHAGOGASTRODUODENOSCOPY      EYE SURGERY      HYSTERECTOMY      age 35    OOPHORECTOMY Left     age 35    OVARY SURGERY      NC BREAST REDUCTION Bilateral 09/19/2017    Procedure: REDUCTION MAMMOPLASTY BREAST;  Surgeon: Yair Torre MD;  Location: [facility];  Service: Plastics    REDUCTION MAMMAPLASTY Bilateral 2017    US GUIDED BREAST BIOPSY RIGHT COMPLETE Right 8/30/2023       Family History   Problem Relation Age of Onset    Breast cancer Mother 55    Diabetes Father     Ovarian cancer Sister 38    Breast cancer Maternal Aunt 50    Colon cancer Neg Hx     Stomach cancer Neg Hx          Medications have been verified.        Objective   /76   Pulse 94   Temp (!) 97.1 °F (36.2 °C)   Resp 19   Ht 5' 2\" (1.575 m)   Wt 73 kg (161 lb)   SpO2 99%   BMI 29.45 kg/m²   No LMP recorded. Patient has had a hysterectomy.       Physical Exam     Physical Exam  Vitals reviewed.   Constitutional:       Appearance: Normal appearance.   Cardiovascular:      Rate and Rhythm: Normal rate and regular rhythm.   Pulmonary:      Effort: Pulmonary effort is normal.      Breath sounds: Normal breath sounds.   Musculoskeletal:         General: Normal range of motion.   Skin:     General: Skin " is warm and dry.      Capillary Refill: Capillary refill takes less than 2 seconds.   Neurological:      General: No focal deficit present.      Mental Status: She is alert.   Psychiatric:         Behavior: Behavior normal.

## 2024-11-15 ENCOUNTER — TELEPHONE (OUTPATIENT)
Age: 49
End: 2024-11-15

## 2024-12-09 ENCOUNTER — NURSE TRIAGE (OUTPATIENT)
Age: 49
End: 2024-12-09

## 2024-12-09 NOTE — TELEPHONE ENCOUNTER
Patient calling with new onset right side pelvic and low back pain. Notes intermittent sharp stabbing pain. Scheduled for NP appointment. Advised ED for severe pain uncontrolled by tylenol and heat. Advised PCP otherwise until established. Patient verbalizes understanding.

## 2024-12-11 ENCOUNTER — NURSE TRIAGE (OUTPATIENT)
Age: 49
End: 2024-12-11

## 2024-12-11 DIAGNOSIS — K64.9 HEMORRHOIDS, UNSPECIFIED HEMORRHOID TYPE: ICD-10-CM

## 2024-12-11 DIAGNOSIS — K62.5 RECTAL BLEEDING: Primary | ICD-10-CM

## 2024-12-11 RX ORDER — HYDROCORTISONE ACETATE 25 MG/1
25 SUPPOSITORY RECTAL 2 TIMES DAILY
Qty: 12 SUPPOSITORY | Refills: 0 | Status: SHIPPED | OUTPATIENT
Start: 2024-12-11

## 2024-12-11 NOTE — TELEPHONE ENCOUNTER
Recommend 1 week of Anusol suppository twice daily, and CBC to evaluate for anemia.  Will also try to move colonoscopy appointment sooner.

## 2024-12-11 NOTE — TELEPHONE ENCOUNTER
Called and spoke with the patient and gave the provider's recommendations. Informed the patient that [facility] and that an order for labwork is in her chart to rule out anemia. Patient was agreeable to the recommendations. Informed the patient that our scheduling staff will reach out to see if they can move her colonoscopy to a sooner date. Patient verbalized understanding and thanked us.

## 2024-12-11 NOTE — TELEPHONE ENCOUNTER
"Patient calling in today because the rectal bleeding has started again.  States that she has had rectal bleeding in the past the doctor prescribed suppositories and that it went away. States that she has her colonoscopy 1/24/25 and would like to know if it can be moved up because of the rectal bleeding but also her daughter is due to give birth around that time and she may be in [place].   States that the reason she is concerned is because the blood has a smell and it usually doesn't smell. She described it all as \" it's like I have my period, the blood, clots and cramping, except I don't have a uterus so I know it's not my period\"  Please review and advise.         Reason for Disposition   Rectal bleeding is a chronic symptom (recurrent or ongoing AND present > 4 weeks)    Answer Assessment - Initial Assessment Questions  1. APPEARANCE of BLOOD: \"What color is it?\" \"Is it passed separately, on the surface of the stool, or mixed in with the stool?\"     \" Its just regular blood with cramping   2. AMOUNT: \"How much blood was passed?\"       Once   3. FREQUENCY: \"How many times has blood been passed with the stools?\"       2 times today   4. ONSET: \"When was the blood first seen in the stools?\" (Days or weeks)       Today   5. DIARRHEA: \"Is there also some diarrhea?\" If Yes, ask: \"How many diarrhea stools in the past 24 hours?\"      denies  6. CONSTIPATION: \"Do you have constipation?\" If Yes, ask: \"How bad is it?\"      Taking Miralax   7. RECURRENT SYMPTOMS: \"Have you had blood in your stools before?\" If Yes, ask: \"When was the last time?\" and \"What happened that time?\"   yes  8. BLOOD THINNERS: \"Do you take any blood thinners?\" (e.g., Coumadin/warfarin, Pradaxa/dabigatran, aspirin)      denies  9. OTHER SYMPTOMS: \"Do you have any other symptoms?\"  (e.g., abdomen pain, vomiting, dizziness, fever)     Cramps like she has her period.    Protocols used: Rectal Bleeding-Adult-[place]    "

## 2024-12-11 NOTE — TELEPHONE ENCOUNTER
Called patient to schedule procedure for an earlier date, patient was agreeable to rescheduling procedure for an earlier date.

## 2024-12-11 NOTE — TELEPHONE ENCOUNTER
FYI- Clerical team called the patient and rescheduled the colonoscopy for 12/27/24 with Dr. Love. Thank you!

## 2024-12-13 ENCOUNTER — ANESTHESIA EVENT (OUTPATIENT)
Dept: ANESTHESIOLOGY | Facility: HOSPITAL | Age: 49
End: 2024-12-13

## 2024-12-13 ENCOUNTER — ANESTHESIA (OUTPATIENT)
Dept: ANESTHESIOLOGY | Facility: HOSPITAL | Age: 49
End: 2024-12-13

## 2024-12-27 DIAGNOSIS — K62.5 RECTAL BLEEDING: Primary | ICD-10-CM

## 2024-12-27 RX ORDER — SODIUM PICOSULFATE, MAGNESIUM OXIDE, AND ANHYDROUS CITRIC ACID 12; 3.5; 1 G/175ML; G/175ML; MG/175ML
LIQUID ORAL
Qty: 350 ML | Refills: 0 | Status: SHIPPED | OUTPATIENT
Start: 2024-12-27

## 2025-01-27 ENCOUNTER — TELEPHONE (OUTPATIENT)
Dept: NEUROSURGERY | Facility: CLINIC | Age: 50
End: 2025-01-27

## 2025-01-27 NOTE — TELEPHONE ENCOUNTER
1/27/25:  NO SHOW FOR TODAYS APPOINTMENT.    New pt, self refer  Back pain  No recent imaging  Ask for new ID, pt states she wants update due to marital status change     1/26/25: PATIENT WENT TO [facility] FOR BACK PAIN WAS GIVEN MEDROL GOMEZ / FLEXERIL AND ORDER TO PHYSICAL THERAPY

## 2025-01-28 NOTE — TELEPHONE ENCOUNTER
1/28/25    Attempted to call the pt twice. Both times is rang, someone picked up the phone put me straight to V, but I couldn't leave .

## 2025-01-30 ENCOUNTER — TELEPHONE (OUTPATIENT)
Dept: GASTROENTEROLOGY | Facility: MEDICAL CENTER | Age: 50
End: 2025-01-30

## 2025-01-30 NOTE — TELEPHONE ENCOUNTER
Confirming Upcoming Procedure: Colonoscopy on Feb 5  Physician performing: Dr. Palafox  Location of procedure:  [facility]  Prep: miralax  Diabetic: No

## 2025-01-31 ENCOUNTER — TELEPHONE (OUTPATIENT)
Dept: GASTROENTEROLOGY | Facility: MEDICAL CENTER | Age: 50
End: 2025-01-31

## 2025-01-31 ENCOUNTER — NURSE TRIAGE (OUTPATIENT)
Age: 50
End: 2025-01-31

## 2025-01-31 DIAGNOSIS — K62.5 RECTAL BLEEDING: Primary | ICD-10-CM

## 2025-01-31 RX ORDER — SODIUM PICOSULFATE, MAGNESIUM OXIDE, AND ANHYDROUS CITRIC ACID 12; 3.5; 1 G/175ML; G/175ML; MG/175ML
LIQUID ORAL
Qty: 350 ML | Refills: 0 | Status: SHIPPED | OUTPATIENT
Start: 2025-01-31

## 2025-01-31 NOTE — TELEPHONE ENCOUNTER
"LOV 09/17/24    Pt transferred to myself by Dede.    Pt reports episode of significant rectal bleeding while at work 01/29. BRB with clots and without BM. She reported to  ED for this. CT AP impression: No evidence of active GI bleeding.  Hgb 10.7, pt reports she has chronic anemia. Chronic back pain and chronic \"tiredness\" is reported. Denies nausea, vomiting, fever, abdominal pain, acute weakness, lightheadedness.     [facility]. She requests this to be sent to [facility] on file. ( This was completed via RX refill protocol @ 3:02 PM today)    Pt requests sooner colonoscopy procedure than current date of 02/05. Attempted to warm transfer to Sara for further assistance however pt disconnected the call while on hold. Information was provided to Sara who reports she will return call to pt.        Reason for Disposition   Rectal bleeding is minimal (e.g., blood just on toilet paper, a few drops in toilet bowl), and bleeding recurs 3 or more times using Care Advice    Protocols used: Rectal Bleeding-Adult-[place]    "
Called patient and left  requesting call back, about rescheduling colonoscopy. Spoke to [facility], if patient calls back OK to schedule with Dr. Palafox for Monday 02/03/25 at 2:45 PM, arrival time 2:00 PM. Please make sure to bring a  for procedure, and nothing to drink after 11:00 AM. I've sent Clenpiq prep instructions via [employer].  
Pt returned missed call. Informed pt that sooner colonoscopy is available at the [facility]. Pt is agreeable to schedule. Transferred to [facility] for further assistance.  
R/s colonoscopy based on prior notes from [facility] to r/s fro Monday Feb 3rd, SIMON HURTADO ,advised arrival time is 2pm, pt confirmed she has prep instructions and has no further questions.   
hair removal not indicated

## 2025-06-11 NOTE — PROGRESS NOTES
Met with patient and Dr. Yamilet Valentine regarding recommendation for;    ___X__ RIGHT ______LEFT      __X___Ultrasound guided  ______Stereotactic breast biopsy. __X___Verbalized understanding.       Blood thinners:  No: __X___ Yes: ______ What:                 Biopsy teaching sheet given:  Yes: ___X___ No: ________    Pt given contact information and adv to call with any questions/needs Yes

## (undated) DEVICE — MEDI-VAC YANKAUER SUCTION HANDLE W/BULBOUS AND CONTROL VENT: Brand: CARDINAL HEALTH

## (undated) DEVICE — SYRINGE 30ML LL

## (undated) DEVICE — CHLORAPREP HI-LITE 26ML ORANGE

## (undated) DEVICE — DRESSING XEROFORM 5 X 9

## (undated) DEVICE — PREP PAD BNS: Brand: CONVERTORS

## (undated) DEVICE — GLOVE SRG BIOGEL 7

## (undated) DEVICE — ADHESIVE SKN CLSR HISTOACRYL FLEX 0.5ML LF

## (undated) DEVICE — BRA SURGICAL SZ LGE (36-39)

## (undated) DEVICE — BULB SYRINGE,IRRIGATION WITH PROTECTIVE CAP: Brand: DOVER

## (undated) DEVICE — TUBING SUCTION 5MM X 12 FT

## (undated) DEVICE — STRL UNIVERSAL LAPAROTOMY PACK: Brand: CARDINAL HEALTH

## (undated) DEVICE — GAUZE SPONGES,16 PLY: Brand: CURITY

## (undated) DEVICE — GLOVE INDICATOR PI UNDERGLOVE SZ 6.5 BLUE

## (undated) DEVICE — SYRINGE 10ML LL

## (undated) DEVICE — SUT VICRYL 3-0 SH 27 IN J416H

## (undated) DEVICE — TUBING LIPOSUCTION ASPIRATION 12FT STERILE

## (undated) DEVICE — SUT VICRYL 2-0 SH 27 IN UNDYED J417H

## (undated) DEVICE — PLUMEPEN PRO 10FT

## (undated) DEVICE — GLOVE SRG BIOGEL 6.5

## (undated) DEVICE — SUT VICRYL 3-0 REEL 54 IN J285G

## (undated) DEVICE — SUT MONOCRYL 3-0 PS-2 27 IN Y427H

## (undated) DEVICE — SPONGE LAP 18 X 18 IN

## (undated) DEVICE — TISSEEL FIBRIN 10 ML FROZEN

## (undated) DEVICE — SUT VICRYL 2-0 CT-1 36 IN J945H

## (undated) DEVICE — SUPER SPONGES,MEDIUM: Brand: KERLIX

## (undated) DEVICE — GLOVE INDICATOR PI UNDERGLOVE SZ 7 BLUE

## (undated) DEVICE — INTENDED FOR TISSUE SEPARATION, AND OTHER PROCEDURES THAT REQUIRE A SHARP SURGICAL BLADE TO PUNCTURE OR CUT.: Brand: BARD-PARKER ® CARBON RIB-BACK BLADES

## (undated) DEVICE — ABDOMINAL PAD: Brand: DERMACEA

## (undated) DEVICE — 2000CC GUARDIAN II: Brand: GUARDIAN

## (undated) DEVICE — SCD SEQUENTIAL COMPRESSION COMFORT SLEEVE MEDIUM KNEE LENGTH: Brand: KENDALL SCD

## (undated) DEVICE — INSULATED BLADE ELECTRODE: Brand: EDGE

## (undated) DEVICE — Device

## (undated) DEVICE — SYRINGE 3ML LL

## (undated) DEVICE — CHEST/BREAST DRAPE: Brand: CONVERTORS

## (undated) DEVICE — TISSEEL EASYSPRAY SET